# Patient Record
Sex: FEMALE | Race: WHITE | NOT HISPANIC OR LATINO | Employment: OTHER | URBAN - METROPOLITAN AREA
[De-identification: names, ages, dates, MRNs, and addresses within clinical notes are randomized per-mention and may not be internally consistent; named-entity substitution may affect disease eponyms.]

---

## 2018-03-14 ENCOUNTER — ANESTHESIA EVENT (OUTPATIENT)
Dept: OBGYN | Facility: CLINIC | Age: 33
End: 2018-03-14
Payer: COMMERCIAL

## 2018-03-14 ENCOUNTER — HOSPITAL ENCOUNTER (INPATIENT)
Facility: CLINIC | Age: 33
LOS: 3 days | Discharge: HOME | End: 2018-03-17
Attending: ADVANCED PRACTICE MIDWIFE | Admitting: OBSTETRICS & GYNECOLOGY
Payer: COMMERCIAL

## 2018-03-14 ENCOUNTER — SURGERY (OUTPATIENT)
Age: 33
End: 2018-03-14

## 2018-03-14 ENCOUNTER — ANESTHESIA (OUTPATIENT)
Dept: OBGYN | Facility: CLINIC | Age: 33
End: 2018-03-14
Payer: COMMERCIAL

## 2018-03-14 DIAGNOSIS — Z98.891 S/P CESAREAN SECTION: Primary | ICD-10-CM

## 2018-03-14 DIAGNOSIS — D68.51 HETEROZYGOUS FACTOR V LEIDEN MUTATION (H): ICD-10-CM

## 2018-03-14 PROBLEM — O41.00X0 ANHYDRAMNIOS: Status: ACTIVE | Noted: 2018-03-14

## 2018-03-14 LAB
ABO + RH BLD: NORMAL
ABO + RH BLD: NORMAL
BASOPHILS # BLD AUTO: 0 10E9/L (ref 0–0.2)
BASOPHILS NFR BLD AUTO: 0.2 %
BLD GP AB SCN SERPL QL: NORMAL
BLOOD BANK CMNT PATIENT-IMP: NORMAL
DIFFERENTIAL METHOD BLD: NORMAL
EOSINOPHIL # BLD AUTO: 0.1 10E9/L (ref 0–0.7)
EOSINOPHIL NFR BLD AUTO: 0.5 %
ERYTHROCYTE [DISTWIDTH] IN BLOOD BY AUTOMATED COUNT: 13.5 % (ref 10–15)
HCT VFR BLD AUTO: 39.2 % (ref 35–47)
HGB BLD-MCNC: 12.8 G/DL (ref 11.7–15.7)
IMM GRANULOCYTES # BLD: 0.1 10E9/L (ref 0–0.4)
IMM GRANULOCYTES NFR BLD: 0.9 %
LYMPHOCYTES # BLD AUTO: 2.4 10E9/L (ref 0.8–5.3)
LYMPHOCYTES NFR BLD AUTO: 21.5 %
MCH RBC QN AUTO: 30.5 PG (ref 26.5–33)
MCHC RBC AUTO-ENTMCNC: 32.7 G/DL (ref 31.5–36.5)
MCV RBC AUTO: 94 FL (ref 78–100)
MONOCYTES # BLD AUTO: 0.8 10E9/L (ref 0–1.3)
MONOCYTES NFR BLD AUTO: 7.2 %
NEUTROPHILS # BLD AUTO: 7.6 10E9/L (ref 1.6–8.3)
NEUTROPHILS NFR BLD AUTO: 69.7 %
NRBC # BLD AUTO: 0 10*3/UL
NRBC BLD AUTO-RTO: 0 /100
PLATELET # BLD AUTO: 193 10E9/L (ref 150–450)
RBC # BLD AUTO: 4.19 10E12/L (ref 3.8–5.2)
SPECIMEN EXP DATE BLD: NORMAL
WBC # BLD AUTO: 10.9 10E9/L (ref 4–11)

## 2018-03-14 PROCEDURE — 25000128 H RX IP 250 OP 636: Performed by: REGISTERED NURSE

## 2018-03-14 PROCEDURE — 36415 COLL VENOUS BLD VENIPUNCTURE: CPT | Performed by: ADVANCED PRACTICE MIDWIFE

## 2018-03-14 PROCEDURE — 36000059 ZZH SURGERY LEVEL 3 EA 15 ADDTL MIN UMMC: Performed by: OBSTETRICS & GYNECOLOGY

## 2018-03-14 PROCEDURE — 88307 TISSUE EXAM BY PATHOLOGIST: CPT | Mod: 26 | Performed by: STUDENT IN AN ORGANIZED HEALTH CARE EDUCATION/TRAINING PROGRAM

## 2018-03-14 PROCEDURE — 88307 TISSUE EXAM BY PATHOLOGIST: CPT | Performed by: STUDENT IN AN ORGANIZED HEALTH CARE EDUCATION/TRAINING PROGRAM

## 2018-03-14 PROCEDURE — 12000030 ZZH R&B OB INTERMEDIATE UMMC

## 2018-03-14 PROCEDURE — 37000009 ZZH ANESTHESIA TECHNICAL FEE, EACH ADDTL 15 MIN: Performed by: OBSTETRICS & GYNECOLOGY

## 2018-03-14 PROCEDURE — 25000125 ZZHC RX 250: Performed by: REGISTERED NURSE

## 2018-03-14 PROCEDURE — 25000128 H RX IP 250 OP 636: Performed by: ADVANCED PRACTICE MIDWIFE

## 2018-03-14 PROCEDURE — 27110028 ZZH OR GENERAL SUPPLY NON-STERILE: Performed by: OBSTETRICS & GYNECOLOGY

## 2018-03-14 PROCEDURE — 86900 BLOOD TYPING SEROLOGIC ABO: CPT | Performed by: ADVANCED PRACTICE MIDWIFE

## 2018-03-14 PROCEDURE — 25000128 H RX IP 250 OP 636: Performed by: ANESTHESIOLOGY

## 2018-03-14 PROCEDURE — 25000132 ZZH RX MED GY IP 250 OP 250 PS 637: Performed by: STUDENT IN AN ORGANIZED HEALTH CARE EDUCATION/TRAINING PROGRAM

## 2018-03-14 PROCEDURE — 86850 RBC ANTIBODY SCREEN: CPT | Performed by: ADVANCED PRACTICE MIDWIFE

## 2018-03-14 PROCEDURE — 40000671 ZZH STATISTIC ANESTHESIA CASE

## 2018-03-14 PROCEDURE — 86901 BLOOD TYPING SEROLOGIC RH(D): CPT | Performed by: ADVANCED PRACTICE MIDWIFE

## 2018-03-14 PROCEDURE — 40000170 ZZH STATISTIC PRE-PROCEDURE ASSESSMENT II: Performed by: OBSTETRICS & GYNECOLOGY

## 2018-03-14 PROCEDURE — 27210794 ZZH OR GENERAL SUPPLY STERILE: Performed by: OBSTETRICS & GYNECOLOGY

## 2018-03-14 PROCEDURE — 25000125 ZZHC RX 250: Performed by: ADVANCED PRACTICE MIDWIFE

## 2018-03-14 PROCEDURE — 36000057 ZZH SURGERY LEVEL 3 1ST 30 MIN - UMMC: Performed by: OBSTETRICS & GYNECOLOGY

## 2018-03-14 PROCEDURE — 71000014 ZZH RECOVERY PHASE 1 LEVEL 2 FIRST HR: Performed by: OBSTETRICS & GYNECOLOGY

## 2018-03-14 PROCEDURE — 37000008 ZZH ANESTHESIA TECHNICAL FEE, 1ST 30 MIN: Performed by: OBSTETRICS & GYNECOLOGY

## 2018-03-14 PROCEDURE — 85025 COMPLETE CBC W/AUTO DIFF WBC: CPT | Performed by: ADVANCED PRACTICE MIDWIFE

## 2018-03-14 PROCEDURE — 86780 TREPONEMA PALLIDUM: CPT | Performed by: ADVANCED PRACTICE MIDWIFE

## 2018-03-14 PROCEDURE — 25000125 ZZHC RX 250: Performed by: STUDENT IN AN ORGANIZED HEALTH CARE EDUCATION/TRAINING PROGRAM

## 2018-03-14 RX ORDER — ONDANSETRON 2 MG/ML
4 INJECTION INTRAMUSCULAR; INTRAVENOUS EVERY 30 MIN PRN
Status: DISCONTINUED | OUTPATIENT
Start: 2018-03-14 | End: 2018-03-14 | Stop reason: HOSPADM

## 2018-03-14 RX ORDER — SODIUM CHLORIDE, SODIUM LACTATE, POTASSIUM CHLORIDE, CALCIUM CHLORIDE 600; 310; 30; 20 MG/100ML; MG/100ML; MG/100ML; MG/100ML
INJECTION, SOLUTION INTRAVENOUS CONTINUOUS
Status: DISCONTINUED | OUTPATIENT
Start: 2018-03-14 | End: 2018-03-14 | Stop reason: HOSPADM

## 2018-03-14 RX ORDER — CLINDAMYCIN PHOSPHATE 900 MG/50ML
INJECTION, SOLUTION INTRAVENOUS PRN
Status: DISCONTINUED | OUTPATIENT
Start: 2018-03-14 | End: 2018-03-14

## 2018-03-14 RX ORDER — DIPHENHYDRAMINE HCL 25 MG
25 CAPSULE ORAL EVERY 6 HOURS PRN
Status: DISCONTINUED | OUTPATIENT
Start: 2018-03-14 | End: 2018-03-17 | Stop reason: HOSPADM

## 2018-03-14 RX ORDER — ACETAMINOPHEN 325 MG/1
650 TABLET ORAL EVERY 4 HOURS PRN
Status: DISCONTINUED | OUTPATIENT
Start: 2018-03-17 | End: 2018-03-17 | Stop reason: HOSPADM

## 2018-03-14 RX ORDER — DEXTROSE, SODIUM CHLORIDE, SODIUM LACTATE, POTASSIUM CHLORIDE, AND CALCIUM CHLORIDE 5; .6; .31; .03; .02 G/100ML; G/100ML; G/100ML; G/100ML; G/100ML
INJECTION, SOLUTION INTRAVENOUS CONTINUOUS
Status: DISCONTINUED | OUTPATIENT
Start: 2018-03-14 | End: 2018-03-17 | Stop reason: HOSPADM

## 2018-03-14 RX ORDER — HYDROCORTISONE 2.5 %
CREAM (GRAM) TOPICAL 3 TIMES DAILY PRN
Status: DISCONTINUED | OUTPATIENT
Start: 2018-03-14 | End: 2018-03-17 | Stop reason: HOSPADM

## 2018-03-14 RX ORDER — KETOROLAC TROMETHAMINE 30 MG/ML
30 INJECTION, SOLUTION INTRAMUSCULAR; INTRAVENOUS EVERY 6 HOURS
Status: DISPENSED | OUTPATIENT
Start: 2018-03-14 | End: 2018-03-15

## 2018-03-14 RX ORDER — IBUPROFEN 400 MG/1
400 TABLET, FILM COATED ORAL EVERY 6 HOURS PRN
Status: DISCONTINUED | OUTPATIENT
Start: 2018-03-14 | End: 2018-03-17 | Stop reason: HOSPADM

## 2018-03-14 RX ORDER — CEFAZOLIN SODIUM 1 G/3ML
INJECTION, POWDER, FOR SOLUTION INTRAMUSCULAR; INTRAVENOUS PRN
Status: DISCONTINUED | OUTPATIENT
Start: 2018-03-14 | End: 2018-03-14

## 2018-03-14 RX ORDER — OXYTOCIN/0.9 % SODIUM CHLORIDE 30/500 ML
100-340 PLASTIC BAG, INJECTION (ML) INTRAVENOUS CONTINUOUS PRN
Status: DISCONTINUED | OUTPATIENT
Start: 2018-03-14 | End: 2018-03-14

## 2018-03-14 RX ORDER — ONDANSETRON 2 MG/ML
4 INJECTION INTRAMUSCULAR; INTRAVENOUS EVERY 6 HOURS PRN
Status: DISCONTINUED | OUTPATIENT
Start: 2018-03-14 | End: 2018-03-17 | Stop reason: HOSPADM

## 2018-03-14 RX ORDER — CITRIC ACID/SODIUM CITRATE 334-500MG
30 SOLUTION, ORAL ORAL ONCE
Status: DISCONTINUED | OUTPATIENT
Start: 2018-03-14 | End: 2018-03-14

## 2018-03-14 RX ORDER — DIPHENHYDRAMINE HYDROCHLORIDE 50 MG/ML
25 INJECTION INTRAMUSCULAR; INTRAVENOUS EVERY 6 HOURS PRN
Status: DISCONTINUED | OUTPATIENT
Start: 2018-03-14 | End: 2018-03-17 | Stop reason: HOSPADM

## 2018-03-14 RX ORDER — MISOPROSTOL 200 UG/1
400 TABLET ORAL
Status: DISCONTINUED | OUTPATIENT
Start: 2018-03-14 | End: 2018-03-17 | Stop reason: HOSPADM

## 2018-03-14 RX ORDER — KETOROLAC TROMETHAMINE 30 MG/ML
INJECTION, SOLUTION INTRAMUSCULAR; INTRAVENOUS PRN
Status: DISCONTINUED | OUTPATIENT
Start: 2018-03-14 | End: 2018-03-14

## 2018-03-14 RX ORDER — ACETAMINOPHEN 325 MG/1
650 TABLET ORAL EVERY 4 HOURS PRN
Status: DISCONTINUED | OUTPATIENT
Start: 2018-03-14 | End: 2018-03-14

## 2018-03-14 RX ORDER — METHYLERGONOVINE MALEATE 0.2 MG/ML
200 INJECTION INTRAVENOUS
Status: COMPLETED | OUTPATIENT
Start: 2018-03-14 | End: 2018-03-14

## 2018-03-14 RX ORDER — IBUPROFEN 800 MG/1
800 TABLET, FILM COATED ORAL
Status: DISCONTINUED | OUTPATIENT
Start: 2018-03-14 | End: 2018-03-14

## 2018-03-14 RX ORDER — OXYTOCIN/0.9 % SODIUM CHLORIDE 30/500 ML
340 PLASTIC BAG, INJECTION (ML) INTRAVENOUS CONTINUOUS PRN
Status: DISCONTINUED | OUTPATIENT
Start: 2018-03-14 | End: 2018-03-17 | Stop reason: HOSPADM

## 2018-03-14 RX ORDER — OXYTOCIN/0.9 % SODIUM CHLORIDE 30/500 ML
PLASTIC BAG, INJECTION (ML) INTRAVENOUS CONTINUOUS PRN
Status: DISCONTINUED | OUTPATIENT
Start: 2018-03-14 | End: 2018-03-14

## 2018-03-14 RX ORDER — AMOXICILLIN 250 MG
1 CAPSULE ORAL 2 TIMES DAILY PRN
Status: DISCONTINUED | OUTPATIENT
Start: 2018-03-14 | End: 2018-03-17 | Stop reason: HOSPADM

## 2018-03-14 RX ORDER — ACETAMINOPHEN 325 MG/1
975 TABLET ORAL EVERY 8 HOURS
Status: DISCONTINUED | OUTPATIENT
Start: 2018-03-14 | End: 2018-03-17 | Stop reason: HOSPADM

## 2018-03-14 RX ORDER — ONDANSETRON 2 MG/ML
4 INJECTION INTRAMUSCULAR; INTRAVENOUS EVERY 6 HOURS PRN
Status: DISCONTINUED | OUTPATIENT
Start: 2018-03-14 | End: 2018-03-14

## 2018-03-14 RX ORDER — NALOXONE HYDROCHLORIDE 0.4 MG/ML
.1-.4 INJECTION, SOLUTION INTRAMUSCULAR; INTRAVENOUS; SUBCUTANEOUS
Status: DISCONTINUED | OUTPATIENT
Start: 2018-03-14 | End: 2018-03-17 | Stop reason: HOSPADM

## 2018-03-14 RX ORDER — HYDROMORPHONE HYDROCHLORIDE 1 MG/ML
.3-.5 INJECTION, SOLUTION INTRAMUSCULAR; INTRAVENOUS; SUBCUTANEOUS EVERY 5 MIN PRN
Status: DISCONTINUED | OUTPATIENT
Start: 2018-03-14 | End: 2018-03-14 | Stop reason: HOSPADM

## 2018-03-14 RX ORDER — OXYTOCIN 10 [USP'U]/ML
10 INJECTION, SOLUTION INTRAMUSCULAR; INTRAVENOUS
Status: DISCONTINUED | OUTPATIENT
Start: 2018-03-14 | End: 2018-03-17 | Stop reason: HOSPADM

## 2018-03-14 RX ORDER — FENTANYL CITRATE 50 UG/ML
50-100 INJECTION, SOLUTION INTRAMUSCULAR; INTRAVENOUS
Status: DISCONTINUED | OUTPATIENT
Start: 2018-03-14 | End: 2018-03-14

## 2018-03-14 RX ORDER — OXYTOCIN 10 [USP'U]/ML
10 INJECTION, SOLUTION INTRAMUSCULAR; INTRAVENOUS
Status: DISCONTINUED | OUTPATIENT
Start: 2018-03-14 | End: 2018-03-14

## 2018-03-14 RX ORDER — OXYCODONE HYDROCHLORIDE 5 MG/1
5-10 TABLET ORAL
Status: DISCONTINUED | OUTPATIENT
Start: 2018-03-14 | End: 2018-03-17 | Stop reason: HOSPADM

## 2018-03-14 RX ORDER — NALOXONE HYDROCHLORIDE 0.4 MG/ML
.1-.4 INJECTION, SOLUTION INTRAMUSCULAR; INTRAVENOUS; SUBCUTANEOUS
Status: DISCONTINUED | OUTPATIENT
Start: 2018-03-14 | End: 2018-03-14

## 2018-03-14 RX ORDER — LIDOCAINE 40 MG/G
CREAM TOPICAL
Status: DISCONTINUED | OUTPATIENT
Start: 2018-03-14 | End: 2018-03-14

## 2018-03-14 RX ORDER — LIDOCAINE 40 MG/G
CREAM TOPICAL
Status: DISCONTINUED | OUTPATIENT
Start: 2018-03-14 | End: 2018-03-17 | Stop reason: HOSPADM

## 2018-03-14 RX ORDER — OXYTOCIN/0.9 % SODIUM CHLORIDE 30/500 ML
100 PLASTIC BAG, INJECTION (ML) INTRAVENOUS CONTINUOUS
Status: DISCONTINUED | OUTPATIENT
Start: 2018-03-14 | End: 2018-03-17 | Stop reason: HOSPADM

## 2018-03-14 RX ORDER — IBUPROFEN 800 MG/1
800 TABLET, FILM COATED ORAL EVERY 6 HOURS PRN
Status: DISCONTINUED | OUTPATIENT
Start: 2018-03-14 | End: 2018-03-17 | Stop reason: HOSPADM

## 2018-03-14 RX ORDER — OXYCODONE AND ACETAMINOPHEN 5; 325 MG/1; MG/1
1 TABLET ORAL
Status: DISCONTINUED | OUTPATIENT
Start: 2018-03-14 | End: 2018-03-14

## 2018-03-14 RX ORDER — BISACODYL 10 MG
10 SUPPOSITORY, RECTAL RECTAL DAILY PRN
Status: DISCONTINUED | OUTPATIENT
Start: 2018-03-16 | End: 2018-03-17 | Stop reason: HOSPADM

## 2018-03-14 RX ORDER — CARBOPROST TROMETHAMINE 250 UG/ML
250 INJECTION, SOLUTION INTRAMUSCULAR
Status: DISCONTINUED | OUTPATIENT
Start: 2018-03-14 | End: 2018-03-14

## 2018-03-14 RX ORDER — ONDANSETRON 4 MG/1
4 TABLET, ORALLY DISINTEGRATING ORAL EVERY 30 MIN PRN
Status: DISCONTINUED | OUTPATIENT
Start: 2018-03-14 | End: 2018-03-14 | Stop reason: HOSPADM

## 2018-03-14 RX ORDER — AMOXICILLIN 250 MG
2 CAPSULE ORAL 2 TIMES DAILY PRN
Status: DISCONTINUED | OUTPATIENT
Start: 2018-03-14 | End: 2018-03-17 | Stop reason: HOSPADM

## 2018-03-14 RX ORDER — FENTANYL CITRATE 50 UG/ML
25-50 INJECTION, SOLUTION INTRAMUSCULAR; INTRAVENOUS
Status: DISCONTINUED | OUTPATIENT
Start: 2018-03-14 | End: 2018-03-14 | Stop reason: HOSPADM

## 2018-03-14 RX ORDER — SODIUM CHLORIDE, SODIUM LACTATE, POTASSIUM CHLORIDE, CALCIUM CHLORIDE 600; 310; 30; 20 MG/100ML; MG/100ML; MG/100ML; MG/100ML
INJECTION, SOLUTION INTRAVENOUS CONTINUOUS
Status: DISCONTINUED | OUTPATIENT
Start: 2018-03-14 | End: 2018-03-14

## 2018-03-14 RX ORDER — LANOLIN 100 %
OINTMENT (GRAM) TOPICAL
Status: DISCONTINUED | OUTPATIENT
Start: 2018-03-14 | End: 2018-03-17 | Stop reason: HOSPADM

## 2018-03-14 RX ORDER — PROPOFOL 10 MG/ML
INJECTION, EMULSION INTRAVENOUS PRN
Status: DISCONTINUED | OUTPATIENT
Start: 2018-03-14 | End: 2018-03-14

## 2018-03-14 RX ORDER — SIMETHICONE 80 MG
80 TABLET,CHEWABLE ORAL 4 TIMES DAILY PRN
Status: DISCONTINUED | OUTPATIENT
Start: 2018-03-14 | End: 2018-03-17 | Stop reason: HOSPADM

## 2018-03-14 RX ORDER — OXYTOCIN/0.9 % SODIUM CHLORIDE 30/500 ML
PLASTIC BAG, INJECTION (ML) INTRAVENOUS
Status: DISCONTINUED
Start: 2018-03-14 | End: 2018-03-14 | Stop reason: HOSPADM

## 2018-03-14 RX ADMIN — SODIUM CHLORIDE, POTASSIUM CHLORIDE, SODIUM LACTATE AND CALCIUM CHLORIDE: 600; 310; 30; 20 INJECTION, SOLUTION INTRAVENOUS at 19:21

## 2018-03-14 RX ADMIN — FENTANYL CITRATE 100 MCG: 50 INJECTION, SOLUTION INTRAMUSCULAR; INTRAVENOUS at 19:56

## 2018-03-14 RX ADMIN — HYDROMORPHONE HYDROCHLORIDE 0.3 MG: 1 INJECTION, SOLUTION INTRAMUSCULAR; INTRAVENOUS; SUBCUTANEOUS at 20:36

## 2018-03-14 RX ADMIN — HYDROMORPHONE HYDROCHLORIDE 0.5 MG: 1 INJECTION, SOLUTION INTRAMUSCULAR; INTRAVENOUS; SUBCUTANEOUS at 22:25

## 2018-03-14 RX ADMIN — FENTANYL CITRATE 50 MCG: 50 INJECTION, SOLUTION INTRAMUSCULAR; INTRAVENOUS at 21:09

## 2018-03-14 RX ADMIN — HYDROMORPHONE HYDROCHLORIDE 0.2 MG: 1 INJECTION, SOLUTION INTRAMUSCULAR; INTRAVENOUS; SUBCUTANEOUS at 20:44

## 2018-03-14 RX ADMIN — METHYLERGONOVINE MALEATE 200 MCG: 0.2 INJECTION INTRAMUSCULAR; INTRAVENOUS at 20:05

## 2018-03-14 RX ADMIN — SODIUM CHLORIDE, POTASSIUM CHLORIDE, SODIUM LACTATE AND CALCIUM CHLORIDE: 600; 310; 30; 20 INJECTION, SOLUTION INTRAVENOUS at 22:40

## 2018-03-14 RX ADMIN — PROPOFOL 200 MG: 10 INJECTION, EMULSION INTRAVENOUS at 19:49

## 2018-03-14 RX ADMIN — HYDROMORPHONE HYDROCHLORIDE 0.5 MG: 1 INJECTION, SOLUTION INTRAMUSCULAR; INTRAVENOUS; SUBCUTANEOUS at 21:20

## 2018-03-14 RX ADMIN — FENTANYL CITRATE 25 MCG: 50 INJECTION, SOLUTION INTRAMUSCULAR; INTRAVENOUS at 21:05

## 2018-03-14 RX ADMIN — FENTANYL CITRATE 25 MCG: 50 INJECTION, SOLUTION INTRAMUSCULAR; INTRAVENOUS at 20:59

## 2018-03-14 RX ADMIN — CLINDAMYCIN PHOSPHATE 900 MG: 18 INJECTION, SOLUTION INTRAVENOUS at 20:12

## 2018-03-14 RX ADMIN — HYDROMORPHONE HYDROCHLORIDE 0.5 MG: 1 INJECTION, SOLUTION INTRAMUSCULAR; INTRAVENOUS; SUBCUTANEOUS at 21:39

## 2018-03-14 RX ADMIN — ONDANSETRON 4 MG: 2 INJECTION INTRAMUSCULAR; INTRAVENOUS at 19:56

## 2018-03-14 RX ADMIN — OXYCODONE HYDROCHLORIDE 5 MG: 5 TABLET ORAL at 22:44

## 2018-03-14 RX ADMIN — OXYTOCIN-SODIUM CHLORIDE 0.9% IV SOLN 30 UNIT/500ML 600 ML/HR: 30-0.9/5 SOLUTION at 19:51

## 2018-03-14 RX ADMIN — HYDROMORPHONE HYDROCHLORIDE 0.3 MG: 1 INJECTION, SOLUTION INTRAMUSCULAR; INTRAVENOUS; SUBCUTANEOUS at 20:51

## 2018-03-14 RX ADMIN — KETOROLAC TROMETHAMINE 30 MG: 30 INJECTION, SOLUTION INTRAMUSCULAR at 20:37

## 2018-03-14 RX ADMIN — OXYTOCIN-SODIUM CHLORIDE 0.9% IV SOLN 30 UNIT/500ML 100 ML/HR: 30-0.9/5 SOLUTION at 22:40

## 2018-03-14 RX ADMIN — ACETAMINOPHEN 975 MG: 325 TABLET, FILM COATED ORAL at 21:25

## 2018-03-14 RX ADMIN — SODIUM CHLORIDE, POTASSIUM CHLORIDE, SODIUM LACTATE AND CALCIUM CHLORIDE: 600; 310; 30; 20 INJECTION, SOLUTION INTRAVENOUS at 19:45

## 2018-03-14 RX ADMIN — HYDROMORPHONE HYDROCHLORIDE 0.2 MG: 1 INJECTION, SOLUTION INTRAMUSCULAR; INTRAVENOUS; SUBCUTANEOUS at 20:53

## 2018-03-14 ASSESSMENT — LIFESTYLE VARIABLES: TOBACCO_USE: 0

## 2018-03-14 ASSESSMENT — COPD QUESTIONNAIRES: COPD: 0

## 2018-03-14 NOTE — IP AVS SNAPSHOT
[facility]    [address] [address]    Phone:  [phone]                                       After Visit Summary   3/14/2018    Lois Nunez    MRN: [id number]           After Visit Summary Signature Page     I have received my discharge instructions, and my questions have been answered. I have discussed any challenges I see with this plan with the nurse or doctor.    ..........................................................................................................................................  Patient/Patient Representative Signature      ..........................................................................................................................................  Patient Representative Print Name and Relationship to Patient    ..................................................               ................................................  Date                                            Time    ..........................................................................................................................................  Reviewed by Signature/Title    ...................................................              ..............................................  Date                                                            Time

## 2018-03-14 NOTE — H&P
ADMIT NOTE  =================  42w0d  Lois Nunez is a 32 year old female     with an No LMP recorded. and Estimated Date of Delivery: Data Unavailable is [facility]/2018 at 4:54 PM for induction of labor.  Indication Postdates with KESHA 0.   Fetal movement- active  ROM- no   GBS- not done per pt refusal    HPI  ================  Pt is here with her  and home birth midwife as a transfer to [facility] midwifery service for IOL for postdates and KESHA on 0 noted on KESHA today.  Pt is hesitant to have IOL but has been trying everything including chiropractic, acupuncture, and exercises to bring on labor.  Agreeable to SVE to then discuss plan of care.    FOB- is involved, present and supportive.  Other labor support- Home Birth Midwife Gricel    PRENATAL COURSE  =================  Prenatal course was   complicated by    Patient Active Problem List    Diagnosis Date Noted     Anhydramnios 2018     Priority: Medium     S/P  section 2018     Priority: Medium     CARDIOVASCULAR SCREENING; LDL GOAL LESS THAN 160 07/10/2012     Priority: Medium    Regular care received at Home Birth clinic.  Started care at 10 weeks gestation, 14 total visits.  Pre pregnancy weight 150. Pre pregnancy BMI 23.  Height 57in  Denies smoking, drug, etoh use during pregnancy.    HISTORIES  ============  Prenatal record reviewed  Allergies   Allergen Reactions     Amoxicillin Hives     As a child     Codeine Sulfate GI Disturbance     Erythromycin GI Disturbance     Past Medical History:   Diagnosis Date     Uncomplicated asthma      Past Surgical History:   Procedure Laterality Date      SECTION N/A 3/14/2018    Procedure:  SECTION;;  Surgeon: Paola Avila MD;  Location: [facility] L+D     LAPAROSCOPIC ABLATION ENDOMETRIOSIS     .  Family History   Problem Relation Age of Onset     DIABETES Maternal Grandmother      HEART DISEASE Maternal Grandmother      Cancer - colorectal  Paternal Grandmother      HEART DISEASE Paternal Grandfather      Social History   Substance Use Topics     Smoking status: Never Smoker     Smokeless tobacco: Never Used     Alcohol use Yes      Comment: ';     Obstetric History       T3      L2     SAB0   TAB0   Ectopic0   Multiple0   Live Births2       # Outcome Date GA Lbr Matthew/2nd Weight Sex Delivery Anes PTL Lv   3 Term 18 42w0d  3.61 kg (7 lb 15.3 oz) F CS-LTranv         Name: MILLIE ESPARZA RAJAN      Apgar1:  7                Apgar5: 8   2 Term 2015 40w3d   F  None  CAMRYN   1 Term 2013 38w3d   M    CAMRYN           LABS:   ===========  GBS - Pt refused screening.   Other labs- O+ reported.   Hg 18 11.9  Hg 10/31/17 11.0    No other prenatal labs readily available - obtain from homebirth midwifery service or draw prn.     ULTRA SOUND:  ==============  No dating or anatomy scan record available.  Pt states no known issue in prengnacy.     BPP 6/8 for KESHA of 0, Cephalic, 3/14/18.     ROS  =========  Pt denies significant respiratory, cardiovacular, GI, or muscular/skeletalcomplaints.    See RN data base ROS.       PHYSICAL EXAM:  ===============  Blood pressure 107/59, pulse 62, temperature 98.6  F (37  C), temperature source Oral, resp. rate 16, SpO2 99 %, unknown if currently breastfeeding.  General appearance: comfortable  Heart: RRR without murmur  Lungs: clear to auscultation   Neuro: denies headache and visual disturbances  Psych: Mentation normal and bright   Legs: 2+/2+, no clonus, no edema      Abdomen: gravid, single vertex fetus, non-tender between contractions.  EFW-  7.5 lbs.   CONTACTIONS: Contractions every 5-8 minutes.  Palpate: mild. Sof resting tone.   FETAL HEART TONES: baseline 150 with moderate FHR variability and  pos accelerations.  No decelerations present.      PELVIC EXAM: 1.5/60/ Mid/ soft/ -2   Large bloody show  MARTINEZ SCORE: 6  BLOODY SHOW: yes s/p SVE   ROM: No. IBOW palpated  FLUID: none  AMNISURE:  not done    ASSESSMENT:  ==============  32 year old  with IUP @ 42w0d in IOL for KEHSA of 0 and postdates   Transfer from Homebirth midwife  Cisse score >5, ripe for multip  Fetal Heart rate tracing Category one  GBS- Pt declined screening  Incomplete prenatal lab panel/ultrasound results       PLAN:  ===========  Admit - see IP orders  -IV saline lock, T&S. Continuous EFM - may use telemetry.   - Reviewed Cisse score >5 for multip does not meet criteria for cervical ripening.  Reviewed AROM evidence based as best option when combined with pitocin but can be offered as initial step if pt desires to avoid pitocin.  Reviewed recommendation of avoiding prolonged ROM with unknown GBS status.  Pt angelica more since SVE, now with some discomfort.  Is open to consider labor induction with Pitocin but would like SVE rechecked to see if changing with current TOCO pattern in 1-2 hours.   -Pain medication - pt planning unmedicated labor.  Plans to use Hydrotherapy, upright.mobility.  Reviewed risked out of waterbirth d/t need for continuous EFM.   -Reviewed GBS status unknown at term, recommend Prophylactic antibiotics per CDC guidelines if ROM>18 hours, pt will consider.    - Discuss with homebirth midwife additional prenatals available or draw prn  -MD consultant on call Margarita/ available in house prn. Will be updated of pt's status on floor.   - Reassess in 1-2 hours and readdress induction with pitocin and prn     Liz Morton          ADDENDUM 3/14/2018 6:02 PM    - FHR with noted late decelerations x2 with maternal position sitting upright/straight in bed.  Moderate variability.  Maternal position readjusted, IV in place.  Margarita TRIPLETT given text page update that pt is here admitted and recent decelerations that resolved.  Liz Morton APRN, CNM

## 2018-03-14 NOTE — PLAN OF CARE
Problem: Patient Care Overview  Goal: Plan of Care/Patient Progress Review  Outcome: Improving  Data: Patient presented to [facility] at 1620.   Reason for maternal/fetal assessment per patient is Induction Of Labor  .  Patient is a . Prenatal record reviewed.      Obstetric History       T2      L2     SAB0   TAB0   Ectopic0   Multiple0   Live Births2       # Outcome Date GA Lbr Matthew/2nd Weight Sex Delivery Anes PTL Lv   3 Current            2 Term 2015 40w3d   F  None  CAMRYN   1 Term 2013 38w3d   M    CAMRYN      . Medical history:   Past Medical History:   Diagnosis Date     Uncomplicated asthma    . Gestational Age 42w0d. VSS. Fetal movement present. Patient denies cramping, backache, vaginal discharge, pelvic pressure, UTI symptoms, GI problems, bloody show, vaginal bleeding, edema, headache, visual disturbances, epigastric or URQ pain, abdominal pain, rupture of membranes. Support persons Jonathon and Gricel- her midwife present.  Action: Verbal consent for EFM. Triage assessment completed. EFM applied upon admission- baby needs continuous monitoring. Uterine assessment reveals occasional contractions. Fetal assessment: Presumed adequate fetal oxygenation documented (see flow record).   Response: Liz Morton CNM informed of patient arrival. Plan per provider is induction of labor for KESHA of 0. Liz recommending pitocin or AROM for induction. Patient verbalized agreement with plan. SVE 1.5/60/-2. Patient now angelica every 2-3 minutes after manual sweep with SVE. Patient would like the FHR telemetry unit set up and to wait to see if this progresses to active labor before interventions. Patient transferred to room 471 ambulatory, oriented to room and call light. Report given to Soumya Saeed RN.

## 2018-03-14 NOTE — IP AVS SNAPSHOT
MRN:[id number]                      After Visit Summary   3/14/2018    Lois Nunez    MRN: [id number]           Thank you!     Thank you for choosing [facility] for your care. Our goal is always to provide you with excellent care. Hearing back from our patients is one way we can continue to improve our services. Please take a few minutes to complete the written survey that you may receive in the mail after you visit with us. Thank you!        Patient Information     Date Of Birth          1985        Designated Caregiver       Most Recent Value    Caregiver    Will someone help with your care after discharge? no      About your hospital stay     You were admitted on:  2018 You last received care in the:  [facility]    You were discharged on:  2018        Reason for your hospital stay       Maternity care                  Who to Call     For medical emergencies, please call 911.  For non-urgent questions about your medical care, please call your primary care provider or clinic, None  For questions related to your surgery, please call your surgery clinic        Attending Provider     Provider Specialty    Liz Morton APRN CNM [facility]    Paola Avila MD OB/Gyn       Primary Care Provider Fax #    Physician No Ref-Primary [phone]      After Care Instructions     Activity       Review discharge instructions            Diet       Resume previous diet            Discharge Instructions - Postpartum visit       Schedule postpartum visit with your provider and return to clinic in 1 and 6 weeks.                  Follow-up Appointments     Follow Up and recommended labs and tests       Please follow up with your OBGYN in 6 weeks for a postpartum visit                  Further instructions from your care team       Postop  Birth Instructions    Activity       Do not lift more than 10 pounds for 6 weeks after surgery.  Ask family and friends for help when  you need it.    No driving until you have stopped taking your pain medications (usually two weeks after surgery).    No heavy exercise or activity for 6 weeks.  Don't do anything that will put a strain on your surgery site.    Don't strain when using the toilet.  Your care team may prescribe a stool softener if you have problems with your bowel movements.     To care for your incision:       Keep the incision clean and dry.    Do not soak your incision in water. No swimming or hot tubs until it has fully healed. You may soak in the bathtub if the water level is below your incision.    Do not use peroxide, gel, cream, lotion, or ointment on your incision.    Adjust your clothes to avoid pressure on your surgery site (check the elastic in your underwear for example).     You may see a small amount of clear or pink drainage and this is normal.  Check with your health care provider:       If the drainage increases or has an odor.    If the incision reddens, you have swelling, or develop a rash.    If you have increased pain and the medicine we prescribed doesn't help.    If you have a fever above 100.4 F (38 C) with or without chills when placing thermometer under your tongue.   The area around your incision (surgery wound), will feel numb.  This is normal. The numbness should go away in less than a year.     Keep your hands clean:  Always wash your hands before touching your incision (surgery wound). This helps reduce your risk of infection. If your hands aren't dirty, you may use an alcohol hand-rub to clean your hands. Keep your nails clean and short.    Call your healthcare provider if you have any of these symptoms:       You soak a sanitary pad with blood within 1 hour, or you see blood clots larger than a golf ball.    Bleeding that lasts more than 6 weeks.    Vaginal discharge that smells bad.    Severe pain, cramping or tenderness in your lower belly area.    A need to urinate more frequently (use the toilet  "more often), more urgently (use the toilet very quickly), or it burns when you urinate.    Nausea and vomiting.    Redness, swelling or pain around a vein in your leg.    Problems breastfeeding or a red or painful area on your breast.    Chest pain and cough or are gasping for air.    Problems with coping with sadness, anxiety or depression. If you have concerns about hurting yourself or the baby, call your provider immediately.      You have questions or concerns after you return home.                  Pending Results     Date and Time Order Name Status Description    3/14/2018 2120 Placenta path order and indications In process             Statement of Approval     Ordered          18 0936  I have reviewed and agree with all the recommendations and orders detailed in this document.  EFFECTIVE NOW     Approved and electronically signed by:  Jazmine Feldman MD             Admission Information     Date & Time Provider Department Dept. Phone    3/14/2018 Paola Avila MD [facility] [phone]      Your Vitals Were     Blood Pressure Pulse Temperature Respirations Pulse Oximetry       111/64 82 97.9  F (36.6  C) (Oral) 16 100%       MyChart Information     [facility] lets you send messages to your doctor, view your test results, renew your prescriptions, schedule appointments and more. To sign up, go to [email or web address] . Click on \"Log in\" on the left side of the screen, which will take you to the Welcome page. Then click on \"Sign up Now\" on the right side of the page.     You will be asked to enter the access code listed below, as well as some personal information. Please follow the directions to create your username and password.     Your access code is: [email or web address]  Expires: 6/15/2018 11:11 AM     Your access code will  in 90 days. If you need help or a new code, please call your [facility] clinic or [phone].        Care EveryWhere ID     This is your Care EveryWhere ID. This could " be used by other organizations to access your [facility] medical records  [id number]        Equal Access to Services     ROSA REYES : Hadii mari Teran, wadavidda saw, qaybta kaalmada tdshelbydaisy, jorge luis cristobaljodielucy remy. So [facility] [phone].    ATENCIÓN: Si habla español, tiene a murguia disposición servicios gratuitos de asistencia lingüística. Llame al [phone].    We comply with applicable federal civil rights laws and [place] laws. We do not discriminate on the basis of race, color, national origin, age, disability, sex, sexual orientation, or gender identity.               Review of your medicines      START taking        Dose / Directions    acetaminophen 325 MG tablet   Commonly known as:  TYLENOL        Dose:  325-650 mg   Take 1-2 tablets (325-650 mg) by mouth every 6 hours as needed for mild pain   Quantity:  100 tablet   Refills:  0       enoxaparin 40 MG/0.4ML injection   Commonly known as:  LOVENOX   Used for:  Heterozygous factor V Leiden mutation (H)        Dose:  40 mg   Inject 0.4 mLs (40 mg) Subcutaneous every 24 hours   Quantity:  42 Syringe   Refills:  0       ibuprofen 600 MG tablet   Commonly known as:  ADVIL/MOTRIN        Dose:  600 mg   Take 1 tablet (600 mg) by mouth every 6 hours as needed for other (carmping)   Quantity:  100 tablet   Refills:  0       oxyCODONE IR 5 MG tablet   Commonly known as:  ROXICODONE        Dose:  5-10 mg   Take 1-2 tablets (5-10 mg) by mouth every 4 hours as needed for other (pain control or improvement in physical function. Hold dose for analgesic side effects.)   Quantity:  15 tablet   Refills:  0       senna-docusate 8.6-50 MG per tablet   Commonly known as:  SENOKOT-S;PERICOLACE        Dose:  2 tablet   Take 2 tablets by mouth 2 times daily as needed for constipation   Quantity:  100 tablet   Refills:  0         STOP taking     HYDROcodone-acetaminophen 5-500 MG per tablet   Commonly known as:  VICODIN           MIRENA (52  MG) 20 MCG/24HR IUD   Generic drug:  levonorgestrel                Where to get your medicines      These medications were sent to [facility] [place], [place] - [address]  [address], [facility] [address]     Phone:  [phone]     acetaminophen 325 MG tablet    enoxaparin 40 MG/0.4ML injection    ibuprofen 600 MG tablet    senna-docusate 8.6-50 MG per tablet         Some of these will need a paper prescription and others can be bought over the counter. Ask your nurse if you have questions.     Bring a paper prescription for each of these medications     oxyCODONE IR 5 MG tablet                Protect others around you: Learn how to safely use, store and throw away your medicines at www.disposemymeds.org.        Information about OPIOIDS     PRESCRIPTION OPIOIDS: WHAT YOU NEED TO KNOW    Prescription opioids can be used to help relieve moderate to severe pain and are often prescribed following a surgery or injury, or for certain health conditions. These medications can be an important part of treatment but also come with serious risks. It is important to work with your health care provider to make sure you are getting the safest, most effective care.    WHAT ARE THE RISKS AND SIDE EFFECTS OF OPIOID USE?  Prescription opioids carry serious risks of addiction and overdose, especially with prolonged use. An opioid overdose, often marked by slowed breathing can cause sudden death. The use of prescription opioids can have a number of side effects as well, even when taken as directed:      Tolerance - meaning you might need to take more of a medication for the same pain relief    Physical dependence - meaning you have symptoms of withdrawal when a medication is stopped    Increased sensitivity to pain    Constipation    Nausea, vomiting, and dry mouth    Sleepiness and dizziness    Confusion    Depression    Low levels of testosterone that can result in lower sex drive, energy, and  strength    Itching and sweating    RISKS ARE GREATER WITH:    History of drug misuse, substance use disorder, or overdose    Mental health conditions (such as depression or anxiety)    Sleep apnea    Older age (65 years or older)    Pregnancy    Avoid alcohol while taking prescription opioids.   Also, unless specifically advised by your health care provider, medications to avoid include:    Benzodiazepines (such as Xanax or Valium)    Muscle relaxants (such as Soma or Flexeril)    Hypnotics (such as Ambien or Lunesta)    Other prescription opioids    KNOW YOUR OPTIONS:  Talk to your health care provider about ways to manage your pain that do not involve prescription opioids. Some of these options may actually work better and have fewer risks and side effects:    Pain relievers such as acetaminophen, ibuprofen, and naproxen    Some medications that are also used for depression or seizures    Physical therapy and exercise    Cognitive behavioral therapy, a psychological, goal-directed approach, in which patients learn how to modify physical, behavioral, and emotional triggers of pain and stress    IF YOU ARE PRESCRIBED OPIOIDS FOR PAIN:    Never take opioids in greater amounts or more often than prescribed    Follow up with your primary health care provider and work together to create a plan on how to manage your pain.    Talk about ways to help manage your pain that do not involve prescription opioids    Talk about all concerns and side effects    Help prevent misuse and abuse    Never sell or share prescription opioids    Never use another person's prescription opioids    Store prescription opioids in a secure place and out of reach of others (this may include visitors, children, friends, and family)    Visit www.cdc.gov/drugoverdose to learn about risks of opioid abuse and overdose    If you believe you may be struggling with addiction, tell your health care provider and ask for guidance or call [facility] National  Helpline at 3-006-373-HELP    LEARN MORE / www.cdc.gov/drugoverdose/prescribing/guideline.html    Safely dispose of unused prescription opioids: Find your local drug take-back programs and more information about the importance of safe disposal at www.doseofreality.[place].gov             Medication List: This is a list of all your medications and when to take them. Check marks below indicate your daily home schedule. Keep this list as a reference.      Medications           Morning Afternoon Evening Bedtime As Needed    acetaminophen 325 MG tablet   Commonly known as:  TYLENOL   Take 1-2 tablets (325-650 mg) by mouth every 6 hours as needed for mild pain   Last time this was given:  975 mg on 3/17/2018  5:03 AM                                enoxaparin 40 MG/0.4ML injection   Commonly known as:  LOVENOX   Inject 0.4 mLs (40 mg) Subcutaneous every 24 hours   Last time this was given:  40 mg on 3/17/2018 11:19 AM                                ibuprofen 600 MG tablet   Commonly known as:  ADVIL/MOTRIN   Take 1 tablet (600 mg) by mouth every 6 hours as needed for other (carmping)   Last time this was given:  800 mg on 3/17/2018  5:03 AM                                oxyCODONE IR 5 MG tablet   Commonly known as:  ROXICODONE   Take 1-2 tablets (5-10 mg) by mouth every 4 hours as needed for other (pain control or improvement in physical function. Hold dose for analgesic side effects.)   Last time this was given:  10 mg on 3/17/2018  9:34 AM                                senna-docusate 8.6-50 MG per tablet   Commonly known as:  SENOKOT-S;PERICOLACE   Take 2 tablets by mouth 2 times daily as needed for constipation   Last time this was given:  1 tablet on 3/17/2018  9:34 AM

## 2018-03-15 LAB
HBV SURFACE AG SERPL QL IA: NONREACTIVE
HGB BLD-MCNC: 10.5 G/DL (ref 11.7–15.7)
HIV 1+2 AB+HIV1 P24 AG SERPL QL IA: NONREACTIVE
RUBV IGG SERPL IA-ACNC: 18 IU/ML
T PALLIDUM IGG+IGM SER QL: NEGATIVE

## 2018-03-15 PROCEDURE — 12000030 ZZH R&B OB INTERMEDIATE UMMC

## 2018-03-15 PROCEDURE — 25000132 ZZH RX MED GY IP 250 OP 250 PS 637: Performed by: STUDENT IN AN ORGANIZED HEALTH CARE EDUCATION/TRAINING PROGRAM

## 2018-03-15 PROCEDURE — 25000128 H RX IP 250 OP 636: Performed by: STUDENT IN AN ORGANIZED HEALTH CARE EDUCATION/TRAINING PROGRAM

## 2018-03-15 PROCEDURE — 36415 COLL VENOUS BLD VENIPUNCTURE: CPT | Performed by: STUDENT IN AN ORGANIZED HEALTH CARE EDUCATION/TRAINING PROGRAM

## 2018-03-15 PROCEDURE — 85018 HEMOGLOBIN: CPT | Performed by: STUDENT IN AN ORGANIZED HEALTH CARE EDUCATION/TRAINING PROGRAM

## 2018-03-15 PROCEDURE — 87340 HEPATITIS B SURFACE AG IA: CPT | Performed by: STUDENT IN AN ORGANIZED HEALTH CARE EDUCATION/TRAINING PROGRAM

## 2018-03-15 PROCEDURE — 86762 RUBELLA ANTIBODY: CPT | Performed by: STUDENT IN AN ORGANIZED HEALTH CARE EDUCATION/TRAINING PROGRAM

## 2018-03-15 PROCEDURE — 87389 HIV-1 AG W/HIV-1&-2 AB AG IA: CPT | Performed by: STUDENT IN AN ORGANIZED HEALTH CARE EDUCATION/TRAINING PROGRAM

## 2018-03-15 RX ADMIN — KETOROLAC TROMETHAMINE 30 MG: 30 INJECTION, SOLUTION INTRAMUSCULAR at 10:09

## 2018-03-15 RX ADMIN — IBUPROFEN 800 MG: 800 TABLET, FILM COATED ORAL at 21:39

## 2018-03-15 RX ADMIN — SIMETHICONE CHEW TAB 80 MG 80 MG: 80 TABLET ORAL at 16:29

## 2018-03-15 RX ADMIN — OXYCODONE HYDROCHLORIDE 5 MG: 5 TABLET ORAL at 07:39

## 2018-03-15 RX ADMIN — SIMETHICONE CHEW TAB 80 MG 80 MG: 80 TABLET ORAL at 21:39

## 2018-03-15 RX ADMIN — OXYCODONE HYDROCHLORIDE 5 MG: 5 TABLET ORAL at 04:57

## 2018-03-15 RX ADMIN — SIMETHICONE CHEW TAB 80 MG 80 MG: 80 TABLET ORAL at 11:19

## 2018-03-15 RX ADMIN — OXYCODONE HYDROCHLORIDE 10 MG: 5 TABLET ORAL at 14:48

## 2018-03-15 RX ADMIN — KETOROLAC TROMETHAMINE 30 MG: 30 INJECTION, SOLUTION INTRAMUSCULAR at 16:05

## 2018-03-15 RX ADMIN — ACETAMINOPHEN 975 MG: 325 TABLET, FILM COATED ORAL at 13:01

## 2018-03-15 RX ADMIN — SENNOSIDES AND DOCUSATE SODIUM 2 TABLET: 8.6; 5 TABLET ORAL at 07:39

## 2018-03-15 RX ADMIN — ACETAMINOPHEN 975 MG: 325 TABLET, FILM COATED ORAL at 21:07

## 2018-03-15 RX ADMIN — SENNOSIDES AND DOCUSATE SODIUM 1 TABLET: 8.6; 5 TABLET ORAL at 21:08

## 2018-03-15 RX ADMIN — OXYCODONE HYDROCHLORIDE 5 MG: 5 TABLET ORAL at 21:08

## 2018-03-15 RX ADMIN — OXYCODONE HYDROCHLORIDE 5 MG: 5 TABLET ORAL at 11:19

## 2018-03-15 RX ADMIN — ACETAMINOPHEN 975 MG: 325 TABLET, FILM COATED ORAL at 04:57

## 2018-03-15 RX ADMIN — OXYCODONE HYDROCHLORIDE 5 MG: 5 TABLET ORAL at 01:41

## 2018-03-15 RX ADMIN — OXYCODONE HYDROCHLORIDE 5 MG: 5 TABLET ORAL at 17:53

## 2018-03-15 RX ADMIN — KETOROLAC TROMETHAMINE 30 MG: 30 INJECTION, SOLUTION INTRAMUSCULAR at 04:15

## 2018-03-15 NOTE — PROGRESS NOTES
[facility]   Post-partum Note    Name:  Lois Nunez  MRN: [id number]    S: Patient is resting comfortably. She was down to the NICU overnight.  Pain is controlled.  Denies dizziness, chest pain, SOB, nausea or vomiting. Tolerating regular diet without nausea or vomiting.  Ambulating without dizziness. Duffy is still in. No flatus.  Lochia is similar to menses.  Breast feeding.  Plans natural family planning for postpartum contraception.     O:   Patient Vitals for the past 24 hrs:   BP Temp Temp src Pulse Heart Rate Resp SpO2   03/15/18 0455 112/59 98.5  F (36.9  C) Oral 69 - 17 98 %   03/15/18 0400 128/67 97.9  F (36.6  C) Oral 66 - 16 100 %   03/15/18 0300 119/74 98.6  F (37  C) Oral 72 - 16 98 %   03/15/18 0200 125/72 97.8  F (36.6  C) Oral 73 - 17 100 %   03/15/18 0100 112/61 97.9  F (36.6  C) Oral 70 - 16 100 %   03/15/18 0030 119/72 98.3  F (36.8  C) Oral 75 - 16 98 %   03/15/18 0000 127/69 98.6  F (37  C) Oral 73 - 17 100 %   18 2211 137/79 98.1  F (36.7  C) Oral - - 11 -   18 128/75 - - - 68 10 -   18 - - - - 66 12 97 %   18 120/75 - - - 63 11 -   18 120/75 97.7  F (36.5  C) Axillary - 61 (!) 6 98 %   185 129/77 - - - 67 - 96 %   18 - - - - - 15 -   18 2100 (!) 146/91 97.5  F (36.4  C) - - 72 10 100 %   18 2049 135/76 - - - - (!) 42 100 %   18 1633 115/65 98.6  F (37  C) Oral 72 - 18 -     Gen:  Resting comfortably, NAD  CV:  RRR, no murmur  Pulm:  CTAB, no wheezes  Abd:  Soft, appropriately ttp, non-distended.Fundus at the umbilicus, firm and non-tender.  Incision: covered by a bandage which is clean  Ext:  non-tender, trace LE edema b/l    I/O last 3 completed shifts:  In: 900 [I.V.:900]  Out: 1026 [Urine:100; Blood:926]    Hgb:   Hemoglobin   Date Value Ref Range Status   2018 12.8 11.7 - 15.7 g/dL Final       Assessment/Plan:  Lois Nunez is a 32 year old  on POD #1 s/p  STAT PLTCS for category II FHT, RFD. Her pregnancy was complicated by anhydramnios, heterozygous for factor V Leiden and asthma.     - HIV, HepB and rubella unknown, will order labs today  - continue with routine postpartum management  Pain: Well-controlled with PO pain medications  Hgb: 12.8>> AM Hgb pending, asymptomatic from acute blood loss anemia. Will discharge with PO iron if hgb returns <10  Rh: positive  Rubella: unk  Feed: breast  BC: Natural family planning  Dispo: DC likely PPD#2-3    # Pain Assessment:   Current Pain Score 3/15/2018 3/15/2018 3/15/2018   Patient currently in pain? denies yes yes   Pain location - Incision Incision   Pain descriptors - Sore Sore   - Lois is experiencing pain due to  section. Pain management was discussed and the plan was created in a collaborative fashion.  Lois's response to the current recommendations: compliant  - Please see the plan for pain management as documented above      Ines Stewart MD PhD  Ob/Gyn PGY-2  3/15/2018 6:41 AM    The patient was seen and examined by me separately from the team.  I have reviewed and agree with the above note.  She is feeling well today, would be open to TAP blocks.  Baby is stable in the NICU, off oxygen, working on feedings.  Discussed recommendation for Lovenox prophylaxis postpartum/post op for 6 weeks, she will think about it.  Continue routine post op care.    Nubia Nix MD, FACOG

## 2018-03-15 NOTE — OP NOTE
[facility]  Full Operative Progress Note     Surgery Date:             3/14/2018  Surgeon:                     Paola Avila MD  Assistants:                 Ines Stewart MD PhD PGY-2                                                Chasity Best, MS3  Pre-op Diagnosis:                     -  at 42w0d  - Category II FHT, remote from delivery  - Oligohydramnios  - uncomplicated Asthma     Post-op Diagnosis:                   - Same   - Liveborn female infant   - suspected abruption     Procedure:                  Primary low-transverse  section with double layer uterine closure via Pfannensteil incision     Anesthesia:                GETA  QBL:               926 mL  IVF:                 1200 mL crystalloid  UOP:              100 mL clear urine at the end of the case  Drains: Duffy catheter   Specimens:                Placenta, cord segment  Complications:                       None     Indications:   Lois Nunez is a 32 year old  at 42w0d admitted to the [facility] service for IOL due to anhydramnios. She was a transfer of care from a home birth [facility] service.  She developed a category II fetal heart tracing with prolonged decels with asher to the 60's. The had a prolonged decel with fetal bradycardic to the 60's that did not return to baseline after 5 minutes. Emergent delivery via  section was recommended.  The risks, benefits, and alternatives of  section were discussed with the patient. She was verbal consented and she agreed to proceed.     Findings:   Vigorous female infant born on 3/14/2017 at 1951 by  section. APGARs 7/8 at 1 and 5 minutes, weighing 3640 g. Cephalic presentation, LOT position. Meconium amniotic fluid. Loose nuchal cord delivered through. Placenta delivered quickly and intact with 3-vessel cord. Suspect possible abruption. Normal appearing uterus, ovaries and fallopian tubes. No abdominal wall or intraabdominal  adhesions.        Procedure Details:   The patient was brought to the OR, where adequate GETA anesthesia was administered.  She was placed in the dorsal supine position with a slight leftward tilt. She was prepped and draped in the usual sterile fashion. A surgical time out was performed. A pfannenstiel skin incision was made with the scalpel, and carried down to the underlying fascia with sharp and blunt dissection. The fascia was incised in the midline, and the incision was extended bluntly. The fascia was bluntly dissected off the rectus muscles.  The rectus muscles were  in the midline, and the peritoneum was entered bluntly, and the opening was extended with digital pressure. The bladder blade was placed. A transverse hysterotomy was made with the scalpel in the lower uterine segment, and the incision was extended with digital pressure. The infant was noted to be in the LOTposition, and was delivered atraumatically. The shoulders delivered easily. A loose nuchal cord was noted. After 60 second delay, the cord was doubly clamped and cut, and the infant was handed off to the awaiting nursery staff. A segment of cord was cut. The placenta was delivered with gentle traction on the umbilical cord and uterine massage. The uterus was exteriorized and cleared of all clots and debris. Uterine tone was noted to be boggy with 20 units of pitocin given through the running IV and uterine massage. She was given a dose of methergine.  The hysterotomy was closed with a running locked suture of 0 Monocryl.  The hysterotomy was then imbricated using an 0 Monocryl suture. The hysterotomy was noted to be hemostatic. The posterior cul-de-sac was irrigated and cleared of all clots and debris. The uterus was returned to the abdomen. The pericolic gutters were irrigated and cleared of all clots and debris. The hysterotomy was reexamined and noted to be hemostatic. The fascia and rectus muscles were examined and areas of  oozing were controlled with electrocautery. The fascia was closed with a running 0 Vicryl suture. The subcutaneous tissue was irrigated and areas of oozing were controlled with electrocautery. The skin was closed with 4-0 monocryle and covered with steristirps, and a sterile dressing.      Patient transferred to PACU in stable condition.    Dr. Avila was scrubbed and present for entire procedure.     Ines Stewart MD PhD  OB/GYN Resident, PGY-2  3/14/2018 8:51 PM       Staff MD Note  I was present and scrubbed for the entire procedure noted above.  I agree with the description above and any necessary changes have been made by me.  Paola Avila MD  3/19/2018  9:13 AM

## 2018-03-15 NOTE — BRIEF OP NOTE
[facility]   Brief Operative Note     Surgery Date: 3/14/2018  Surgeon:  Paola Avila MD  Assistants:  Ines Stewart MD PhD PGY-2    Chasity Best, MS3  Pre-op Diagnosis:    -  at 42w0d  - Category II FHT, remote from delivery  - Oligohydramnios  - uncomplicated Asthma     Post-op Diagnosis:    - Same   - Liveborn female infant   - suspected abruption    Procedure:  Primary low-transverse  section with double layer uterine closure via Pfannensteil incision    Anesthesia: GETA  QBL:  926 mL  IVF:  1200 mL crystalloid  UOP:  100 mL clear urine at the end of the case  Drains: Duffy catheter   Specimens:  Placenta, cord segment  Complications: None     Findings:   Vigorous female infant born on 3/14/2017 at 1950 by  section. APGARs 7/8 at 1 and 5 minutes, weighing pending. Cephalic presentation, LOT position. Meconium amniotic fluid. Loose nuchal cord delivered through. Placenta delivered quickly and intact with 3-vessel cord. Suspect possible abruption. Normal appearing uterus, ovaries and fallopian tubes. No abdominal wall or intraabdominal adhesions.     Disposition:  Stable to PACU    Ines Stewart MD PhD  OB/GYN Resident, PGY-2  3/14/2018 8:46 PM

## 2018-03-15 NOTE — LACTATION NOTE
"D:  I met with Kristina.  She is normally in good health, takes no medications, and has no history of breast/chest surgery or trauma. She mentioned history of gallstones. She breast fed her first two children for 15 months, needing to supplement with donor milk with her daughter. She has already started to pump.   I:  I gave her a folder of introductory materials, reviewed physiology of colostrum and milk production, pumping guidelines, and I gave her a log and encouraged her to use it.   I explained how to access the videos \"Hand Expression\" and \"Maximizing Milk Production\"; as well as other helpful books and websites.   We discussed hands-on pumping techniques and usefulness of a hands-free pumping bra which she has.  We discussed skin to skin holding and how to reach breastfeeding goals.  We talked about medications during breastfeeding.  She verbalized understanding.  I advised her to call her insurance company about pump coverage as she would prefer a different pump (Hygia) than her insurance gives out here. I also observed a breastfeeding. We discussed supportive hold, positioning, latch, breastfeeding patterns and infant driven feeding, breast support and compressions, use/rationale of the nipple shield, skin to skin benefits, and timing of pumpings around breastfeedings.  She had initially latched her baby and reported suckling for 15 minutes. During this visit, baby was frantic so we talked about calming her before latch by having her suck on a finger; she latched briefly but was frantic again. I fitted her with a 20mm shield and instructed her in its use. Baby latched, suckled briefly and then fell asleep. We discussed option to finger feed small volumes as IV fluids are weaned as supplement after breastfeeding. She agreed to donor milk use as bridge, but she plans to breastfeed ad yaritza first.  A: Experienced mom working on breastfeeding, has information she needs to initiate her supply.  P:  Will continue to " provide lactation support.  Senia Montalvo, RNC, IBCLC

## 2018-03-15 NOTE — PLAN OF CARE
Problem: Patient Care Overview  Goal: Plan of Care/Patient Progress Review  Outcome: Improving  Patient has getting good pain control with current pain medication. Able to ambulate to the bathroom with stand by assist. She is voiding freely post bautista. Going down in NICU by wheelchair this morning and noon until baby transferred to unit this evening. She was breastfeeding while in NICU and pumping as well with minimal amount of colostrum being collected.  Was able to latch baby well after transfer with minimal assist but other wise she is independent.  Will continue with plan of care.

## 2018-03-15 NOTE — ANESTHESIA CARE TRANSFER NOTE
Patient: Lois Nunez    Procedure(s):   - Wound Class: II-Clean Contaminated    Diagnosis: Pregnancy  Diagnosis Additional Information: No value filed.    Anesthesia Type:   No value filed.     Note:  Airway :Face Mask  Patient transferred to:PACU  Comments: .Anesthesia Care Transfer Note    Patient: Lois Nunez    Transferred to: PACU    Patient vital signs: stable    Airway: none    Monitors applied, VSS.  Patient awake and comfortable, breathing spontaneously.  Report given to RN with transfer of care.        Anne-Marie Hdz CRNA  3/14/2018  8:56 PM  Handoff Report: Identifed the Patient, Identified the Reponsible Provider, Reviewed the pertinent medical history, Discussed the surgical course, Reviewed Intra-OP anesthesia mangement and issues during anesthesia, Set expectations for post-procedure period and Allowed opportunity for questions and acknowledgement of understanding      Vitals: (Last set prior to Anesthesia Care Transfer)    CRNA VITALS  3/14/2018 2014 - 3/14/2018 2056      3/14/2018             Resp Rate (observed): (!)  1                Electronically Signed By: PA Pena CRNA  March 14, 2018  8:56 PM

## 2018-03-15 NOTE — PLAN OF CARE
Patient arrived to [facility] unit via zoom cart at 0000,with belongings, accompanied by spouse/ significant other. Stopped at NICU prior to transferring to [facility]. Received report from Soumya SMITH RN. Unit and room orientation started. Call light given; no concerns present at this time. Continue with plan of care.

## 2018-03-15 NOTE — PLAN OF CARE
Data: Lois Nunez transferred to postpartum room 7144 via cart at 0000. Baby in NICU. Parents visited infant in NICU after leaving PACU and prior to transferring to postpartum.  Action: Receiving unit notified of transfer: Yes. Patient and family notified of room change. Report given to Klaudia GRADY at bedside. Belongings sent to receiving unit. Accompanied by Registered Nurse. Oriented patient to surroundings. Call light within reach.  Response: Patient tolerated transfer and is stable.

## 2018-03-15 NOTE — DISCHARGE SUMMARY
[facility] Discharge Summary    Lois Nunez MRN# [id number]   Age: 32 year old YOB: 1985     Date of Admission:  3/14/2018  Date of Discharge:  3/16/2018   Admitting Physician:  PA Cabral [facility]  Discharge Physician:  Amita Danielson MD    Admit Dx:   -  at 42w0d  - Anhydramnios  - uncomplicated Asthma  - Factor V Leiden, heterozygous     Discharge Dx:  - Same as above, s/p primary low transverse  section  - Category II FHT, remote from delivery  - suspected abruption    Procedures:  - primary low transverse  section with double layer uterine closure via Pfannenstiel incision  - GETA analgesia  - TAP block    Admit HPI:  Lois Nunez is a 32 year old  at 42w0d admitted to the [facility] service for IOL due to anhydramnios. She was a transfer of care from a home birth [facility] service.  She developed a category II fetal heart tracing with prolonged decels with asher to the 60's. The had a prolonged decel with fetal bradycardic to the 60's that did not return to baseline after 5 minutes. Emergent delivery via  section was recommended.  The risks, benefits, and alternatives of  section were discussed with the patient. She was verbal consented and she agreed to proceed.      Please see her admit H&P for full details of her PMH, PSH, Meds, Allergies and exam on admit.    Operative Course:  Surgery was uncomplicated. QBL from the delivery was 926 ml. Please see her  Section Operative Note for full details regarding her delivery.    Operative Findings:   Vigorous female infant born on 3/14/2017 at 1950 by  section. APGARs 7/8 at 1 and 5 minutes, weighing 3640 g. Cephalic presentation, LOT position. Meconium amniotic fluid. Loose nuchal cord delivered through. Placenta delivered quickly and intact with 3-vessel cord. Suspect possible abruption. Normal appearing uterus, ovaries and fallopian tubes. No  abdominal wall or intraabdominal adhesions.        Postoperative Course:  Her postoperative course was uncomplicated. On POD#2, she was meeting all of her postpartum goals and deemed stable for discharge. She was voiding without difficulty, tolerating a regular diet without nausea and vomiting, her pain was well controlled on oral pain medicines and her lochia was appropriate. Her hemoglobin prior to delivery was 12.8 and after delivery was 10.5. Her Rh status was positive and Rhogam was not indicated. Due to factor 5 Leiden heterozygote with family history of clotting and postpartum postoperative status, lovenox was recommended postpartum.    Discharge Medications:     Review of your medicines      START taking       Dose / Directions    acetaminophen 325 MG tablet   Commonly known as:  TYLENOL        Dose:  325-650 mg   Take 1-2 tablets (325-650 mg) by mouth every 6 hours as needed for mild pain   Quantity:  100 tablet   Refills:  0       enoxaparin 40 MG/0.4ML injection   Commonly known as:  LOVENOX   Used for:  Heterozygous factor V Leiden mutation (H)        Dose:  40 mg   Inject 0.4 mLs (40 mg) Subcutaneous every 24 hours   Quantity:  42 Syringe   Refills:  0       ibuprofen 600 MG tablet   Commonly known as:  ADVIL/MOTRIN        Dose:  600 mg   Take 1 tablet (600 mg) by mouth every 6 hours as needed for other (carmping)   Quantity:  100 tablet   Refills:  0       oxyCODONE IR 5 MG tablet   Commonly known as:  ROXICODONE        Dose:  5-10 mg   Take 1-2 tablets (5-10 mg) by mouth every 4 hours as needed for other (pain control or improvement in physical function. Hold dose for analgesic side effects.)   Quantity:  15 tablet   Refills:  0       senna-docusate 8.6-50 MG per tablet   Commonly known as:  SENOKOT-S;PERICOLACE        Dose:  2 tablet   Take 2 tablets by mouth 2 times daily as needed for constipation   Quantity:  100 tablet   Refills:  0         STOP taking          HYDROcodone-acetaminophen 5-500 MG  per tablet   Commonly known as:  VICODIN           MIRENA (52 MG) 20 MCG/24HR IUD   Generic drug:  levonorgestrel                Where to get your medicines      These medications were sent to [facility] [place], [place] - [address]  [address], [facility] [address]     Phone:  [phone]      acetaminophen 325 MG tablet     enoxaparin 40 MG/0.4ML injection     ibuprofen 600 MG tablet     senna-docusate 8.6-50 MG per tablet         Some of these will need a paper prescription and others can be bought over the counter. Ask your nurse if you have questions.     Bring a paper prescription for each of these medications      oxyCODONE IR 5 MG tablet                 Discharge/Disposition:  Lois Nunez was discharged to home in stable condition with the following instructions/medications:  1) Call for temperature > 100.4, bright red vaginal bleeding >1 pad an hour x 2 hours, foul smelling vaginal discharge, pain not controlled by usual oral pain meds, persistent nausea and vomiting not controlled on medications, drainage or redness from incision site  2) She desired natural family planning for contraception.  3) For feeding she decided to breast feed.  4) She was instructed to follow-up with her CNM as well as OBGYN  5) Discharge activity:  No heavy lifting >15 lbs or strenuous activity for 6 weeks, pelvic rest for 6 weeks, no driving or operating machinery while on narcotics.    # Discharge Pain Plan:   - During her hospitalization, Lois experienced pain due to  section.  The pain plan for discharge was discussed with Lois and the plan was created in a collaborative fashion.    - Pharmacologic adjuvants:  Oxycodone, NSAID, acetamenophen    Trina Gottlieb  OB GYN PGY-3    Appreciate note by Dr. Gottlieb. Patient has been seen and examined by me separate from the resident, agree with above note.     Jazmine Feldman MD  10:16 AM

## 2018-03-15 NOTE — PROGRESS NOTES
Received referral for SW to see regarding baby in NICU.      Consulted with NICU physicians.  Baby is doing well and NICU admission is anticipated to be brief.   Baby may be able to transfer to [facility] later this evening.       No indication for NICU psychosocial assessment at this time.      No further SW intervention anticipated.  Please refer again if needs/concerns arise prior to discharge.

## 2018-03-15 NOTE — ANESTHESIA POSTPROCEDURE EVALUATION
Patient: Lois Nunez    Procedure(s):   - Wound Class: II-Clean Contaminated    Diagnosis:Pregnancy  Diagnosis Additional Information: No value filed.    Anesthesia Type:  General, ETT, RSI    Note:  Anesthesia Post Evaluation    Patient location during evaluation: PACU  Patient participation: Able to fully participate in evaluation  Level of consciousness: awake and alert  Pain management: adequate  Airway patency: patent  Cardiovascular status: acceptable  Respiratory status: acceptable  Hydration status: acceptable  PONV: none     Anesthetic complications: None          Last vitals:  Vitals:    03/14/18 2115 03/14/18 2130 03/14/18 2134   BP: 129/77 120/75 120/75   Pulse:      Resp:  (!) 6 11   Temp:  36.5  C (97.7  F)    SpO2: 96% 98%          Electronically Signed By: Ivan Leyva MD  March 14, 2018  9:38 PM

## 2018-03-15 NOTE — PLAN OF CARE
Problem: Patient Care Overview  Goal: Plan of Care/Patient Progress Review  Outcome: Improving  PP assessment WNL. Vital signs stable. Pt up ad yaritza with stand by assistance U/2 firm small flow. IV is saline locked. Intake and output appropriate. Duffy remains in place. Pain managed tonight with Toradol, tylenol, and 5mg Roxicodone. Pt down to NICU tonight via wheelchair to breastfeed infant. Utilizing double electric breast pump and hand expression as well. , Jonathon in room-positive support system. Will continue with pt plan of care.

## 2018-03-15 NOTE — PROVIDER NOTIFICATION
03/15/18 0132   Provider Notification   Provider Name/Title Dr. Stewart   Method of Notification Electronic Page   Request Evaluate-Remote   Notification Reason Other   Would you like labs for pt HIV and Hepatitis B status drawn in AM since they are unknown? Also Rubella status is unknown.     Thanks!

## 2018-03-15 NOTE — PROGRESS NOTES
Blood pressure 115/65, pulse 72, temperature 98.6  F (37  C), temperature source Oral, resp. rate 18.    Called to room for FHR tracing. FHR deceleration to 60's x 8 minutes with slow recovery to baseline. Moderate variability throughout.  O2 on and pt repositioned to hands and knees position. IV fluid bolus started.   CONTRACTIONS: mild and every 2-3 minutes  Pitocin- none,  Antibiotics- none  ROM: not ruptured  PELVIC EXAM: deferred    ASSESSMENT:  ==============  IUP @ 42w0d for induction of labor.  Indication: anyhydramnios   Fetal Heart Rate Tracing category two  GBS- not done    PLAN:  ===========  Continue intrauterine resuscitative measures as needed.   Discussed labor induction with Pitocin with FHR Category 1. Pt agreeable to plan.  MD consultant on call Dr. Avila updated and aware of decelerations    PA Bonner CNM

## 2018-03-15 NOTE — ANESTHESIA PREPROCEDURE EVALUATION
Anesthesia Evaluation     . Pt has had prior anesthetic.     No history of anesthetic complications          ROS/MED HX    ENT/Pulmonary:     (+)Intermittent asthma , . .   (-) tobacco use and COPD   Neurologic:      (-) CVA, TIA and Neuropathy   Cardiovascular:        (-) hypertension, CAD, irregular heartbeat/palpitations and stent   METS/Exercise Tolerance:     Hematologic:        (-) anemia   Musculoskeletal:         GI/Hepatic:        (-) GERD and liver disease   Renal/Genitourinary:      (-) renal disease   Endo:      (-) Type I DM, Type II DM and thyroid disease   Psychiatric:         Infectious Disease:  - neg infectious disease ROS       Malignancy:         Other:                     Physical Exam  Normal systems: dental    Airway   Mallampati: II  TM distance: >3 FB  Neck ROM: full    Dental     Cardiovascular   Rhythm and rate: regular and normal      Pulmonary              HPI: Lois Nunez is a  32 year old female with an IUP at 42w0d complicated by prolonged decels.    History and physical reviewed; no interval change.    Procedure: Procedure(s):   - Wound Class: II-Clean Contaminated       PMHx/PSHx/ROS:  Past Medical History:   Diagnosis Date     Uncomplicated asthma        Past Surgical History:   Procedure Laterality Date     LAPAROSCOPIC ABLATION ENDOMETRIOSIS              Anesthesia Plan      History & Physical Review  History and physical reviewed and following examination; no interval change.    ASA Status:  2 emergent.    NPO Status:  Waived due to emergency    Plan for General, ETT and RSI with Intravenous induction. Maintenance will be Balanced.    PONV prophylaxis:  Ondansetron (or other 5HT-3) and Dexamethasone or Solumedrol  Additional equipment: Videolaryngoscope      Postoperative Care  Postoperative pain management:  Oral pain medications and Multi-modal analgesia.      Consents  Anesthetic plan, risks, benefits and alternatives discussed with:  Patient..        Ivan HALEY  MD Gordon  9:35 PM March 14, 2018

## 2018-03-15 NOTE — PROGRESS NOTES
Recurrent prolonged decelerations to 60-70's, moderate variability throughout. Discussed concern for fetal status and recommendation for emergent  at this time with patient and FOB. Agreeable to plan. Care transferred to MD team for code .    -Mynor Sanchez, PA CASTELLANOM

## 2018-03-16 ENCOUNTER — APPOINTMENT (OUTPATIENT)
Dept: ULTRASOUND IMAGING | Facility: CLINIC | Age: 33
End: 2018-03-16
Attending: OBSTETRICS & GYNECOLOGY
Payer: COMMERCIAL

## 2018-03-16 PROCEDURE — 93971 EXTREMITY STUDY: CPT | Mod: RT

## 2018-03-16 PROCEDURE — 12000028 ZZH R&B OB UMMC

## 2018-03-16 PROCEDURE — 25000128 H RX IP 250 OP 636: Performed by: STUDENT IN AN ORGANIZED HEALTH CARE EDUCATION/TRAINING PROGRAM

## 2018-03-16 PROCEDURE — 25000132 ZZH RX MED GY IP 250 OP 250 PS 637: Performed by: STUDENT IN AN ORGANIZED HEALTH CARE EDUCATION/TRAINING PROGRAM

## 2018-03-16 RX ORDER — OXYCODONE HYDROCHLORIDE 5 MG/1
5-10 TABLET ORAL EVERY 4 HOURS PRN
Qty: 15 TABLET | Refills: 0 | Status: SHIPPED | OUTPATIENT
Start: 2018-03-16 | End: 2022-05-17

## 2018-03-16 RX ORDER — ACETAMINOPHEN 325 MG/1
325-650 TABLET ORAL EVERY 6 HOURS PRN
Qty: 100 TABLET | Refills: 0 | Status: SHIPPED | OUTPATIENT
Start: 2018-03-16 | End: 2018-03-16

## 2018-03-16 RX ORDER — OXYCODONE HYDROCHLORIDE 5 MG/1
5-10 TABLET ORAL EVERY 4 HOURS PRN
Qty: 15 TABLET | Refills: 0 | Status: SHIPPED | OUTPATIENT
Start: 2018-03-16 | End: 2018-03-16

## 2018-03-16 RX ORDER — AMOXICILLIN 250 MG
2 CAPSULE ORAL 2 TIMES DAILY PRN
Qty: 100 TABLET | Refills: 0 | Status: SHIPPED | OUTPATIENT
Start: 2018-03-16 | End: 2022-05-17

## 2018-03-16 RX ORDER — ACETAMINOPHEN 325 MG/1
325-650 TABLET ORAL EVERY 6 HOURS PRN
Qty: 100 TABLET | Refills: 0 | Status: SHIPPED | OUTPATIENT
Start: 2018-03-16 | End: 2022-05-17

## 2018-03-16 RX ORDER — IBUPROFEN 600 MG/1
600 TABLET, FILM COATED ORAL EVERY 6 HOURS PRN
Qty: 100 TABLET | Refills: 0 | Status: SHIPPED | OUTPATIENT
Start: 2018-03-16 | End: 2022-05-17

## 2018-03-16 RX ORDER — AMOXICILLIN 250 MG
2 CAPSULE ORAL 2 TIMES DAILY PRN
Qty: 100 TABLET | Refills: 0 | Status: SHIPPED | OUTPATIENT
Start: 2018-03-16 | End: 2018-03-16

## 2018-03-16 RX ORDER — IBUPROFEN 600 MG/1
600 TABLET, FILM COATED ORAL EVERY 6 HOURS PRN
Qty: 100 TABLET | Refills: 0 | Status: SHIPPED | OUTPATIENT
Start: 2018-03-16 | End: 2018-03-16

## 2018-03-16 RX ADMIN — OXYCODONE HYDROCHLORIDE 10 MG: 5 TABLET ORAL at 16:28

## 2018-03-16 RX ADMIN — IBUPROFEN 800 MG: 800 TABLET, FILM COATED ORAL at 03:42

## 2018-03-16 RX ADMIN — OXYCODONE HYDROCHLORIDE 5 MG: 5 TABLET ORAL at 14:04

## 2018-03-16 RX ADMIN — ACETAMINOPHEN 975 MG: 325 TABLET, FILM COATED ORAL at 05:16

## 2018-03-16 RX ADMIN — OXYCODONE HYDROCHLORIDE 10 MG: 5 TABLET ORAL at 07:14

## 2018-03-16 RX ADMIN — OXYCODONE HYDROCHLORIDE 5 MG: 5 TABLET ORAL at 13:19

## 2018-03-16 RX ADMIN — ENOXAPARIN SODIUM 40 MG: 40 INJECTION SUBCUTANEOUS at 10:08

## 2018-03-16 RX ADMIN — OXYCODONE HYDROCHLORIDE 10 MG: 5 TABLET ORAL at 19:30

## 2018-03-16 RX ADMIN — OXYCODONE HYDROCHLORIDE 10 MG: 5 TABLET ORAL at 23:00

## 2018-03-16 RX ADMIN — ACETAMINOPHEN 975 MG: 325 TABLET, FILM COATED ORAL at 13:20

## 2018-03-16 RX ADMIN — SENNOSIDES AND DOCUSATE SODIUM 2 TABLET: 8.6; 5 TABLET ORAL at 20:08

## 2018-03-16 RX ADMIN — SIMETHICONE CHEW TAB 80 MG 80 MG: 80 TABLET ORAL at 08:20

## 2018-03-16 RX ADMIN — OXYCODONE HYDROCHLORIDE 10 MG: 5 TABLET ORAL at 03:41

## 2018-03-16 RX ADMIN — SIMETHICONE CHEW TAB 80 MG 80 MG: 80 TABLET ORAL at 03:41

## 2018-03-16 RX ADMIN — IBUPROFEN 800 MG: 800 TABLET, FILM COATED ORAL at 16:28

## 2018-03-16 RX ADMIN — ACETAMINOPHEN 975 MG: 325 TABLET, FILM COATED ORAL at 21:11

## 2018-03-16 RX ADMIN — OXYCODONE HYDROCHLORIDE 10 MG: 5 TABLET ORAL at 01:01

## 2018-03-16 RX ADMIN — IBUPROFEN 800 MG: 800 TABLET, FILM COATED ORAL at 10:07

## 2018-03-16 RX ADMIN — SENNOSIDES AND DOCUSATE SODIUM 2 TABLET: 8.6; 5 TABLET ORAL at 08:20

## 2018-03-16 RX ADMIN — IBUPROFEN 800 MG: 800 TABLET, FILM COATED ORAL at 23:00

## 2018-03-16 RX ADMIN — OXYCODONE HYDROCHLORIDE 10 MG: 5 TABLET ORAL at 10:13

## 2018-03-16 NOTE — PROGRESS NOTES
[facility]   Post-partum Note    Name:  Lois Nunez  MRN: [id number]    S: Patient is feeling well this morning.  Pain is mostly controlled but would like to work on pain control today.  Denies dizziness, chest pain, SOB, nausea or vomiting. Tolerating regular diet without nausea or vomiting.  Ambulating without dizziness.  Spontaneously voiding. Reports flatus.  Lochia is less than menses.  Breast feeding.  Plans natural family planning for postpartum contraception. She is agreeable to lovenox treatment due to her heterozygous facto V leiden status.     O:   Patient Vitals for the past 24 hrs:   BP Temp Temp src Pulse Resp SpO2   18 0343 101/64 98.4  F (36.9  C) Oral 67 20 -   03/15/18 1630 114/72 98.4  F (36.9  C) Oral 63 16 100 %   03/15/18 1250 107/59 98.6  F (37  C) Oral 62 16 99 %   03/15/18 0735 113/68 98.4  F (36.9  C) Oral 73 18 100 %     Gen:  Resting comfortably, NAD  CV:  RRR, no murmur  Pulm:  CTAB, no wheezes  Abd:  Soft, appropriately ttp, non-distended.Fundus at the umbilicus, firm and non-tender.  Incision: c/d/i no surrounding redness or erythema  Ext:  non-tender, trace LE edema b/l    I/O last 3 completed shifts:  In: -   Out:  [Urine:; Blood:25]    Hgb:   Hemoglobin   Date Value Ref Range Status   03/15/2018 10.5 (L) 11.7 - 15.7 g/dL Final       Assessment/Plan:  Lois Nunez is a 32 year old  on POD #2 s/p STAT PLTCS for category II FHT, RFD. Her pregnancy was complicated by anhydramnios, heterozygous for factor V Leiden and asthma.      Factor V Leiden heterozygous:  - no personal history of DVT or abnormal clotting  - accepted 6 weeks of lovenox PP  - encouraged to follow up with OB PP    Postpartum cares:  - continue with routine postpartum management  Pain:               Mostly controlled with PO pain medications  Hgb:                12.8>> 10.5, asymptomatic from acute blood loss anemia  Rh:                  positive  Rubella:   Immune  PNC labs:  HIV neg and HepB NR  Feed:              breast  BC:                 Natural family planning  Dispo:             DC likely today or tomorrow    # Pain Assessment:   Current Pain Score 3/16/2018 3/16/2018 3/15/2018   Patient currently in pain? yes yes yes   Pain location Incision Incision -   Pain descriptors Aching Constant;Aching -   - Lois is experiencing pain due to  section. Pain management was discussed and the plan was created in a collaborative fashion.  Lois's response to the current recommendations: compliant  - Please see the plan for pain management as documented above    Ines Stewart MD PhD  Ob/Gyn PGY-2  3/16/2018 6:45 AM    I personally examined and evaluated Lois Nunez on 3/16/2018 independently from the resident.  I discussed the patient with the team and agree with the assessment and plan of care as documented in the above note with edits by me. Additional comments: Lois noting RLE pain this morning; exam notable for palpable, what appears to be superficial, venous thrombosis. Will obtain US to rule out DVT given postop state and FVL heterozygosity. Also noting ongoing pain, though managed sufficiently with medications. Plan likely DC to home POD#3, but pending above testing.     Amita Danielson MD  10:18 AM

## 2018-03-16 NOTE — PLAN OF CARE
Problem: Patient Care Overview  Goal: Plan of Care/Patient Progress Review  Outcome: Declining  Patient is moving better, positioning herself comfortably during breastfeeding.  She is also pumping and doing hand expression with result. Pain on right leg is still present but US showed absence of thrombus. Will continue with plan of care.

## 2018-03-16 NOTE — PROGRESS NOTES
I was asked to evaluate Lois for post-operative pain control given that she is now POD2 (3/16) s/p emergency c/s. Of note, prior to said unplanned c/s, the patient was not consented for a TAP block. Today she shared with her nursing staff and OBGYN primary team that she is experiencing significant abdominal discomfort and expressed interest in a TAP block post-operatively. Importantly, it has been >24 hours since her c/s.    On interview, the patient reports that at rest her pain is a 2/10, but worsens significantly with ambulation. Specifically, she confidently states that her pain is deeper, non-incisional-type pain. She denies radiation, weakness.    The efficacy of TAP blocks with Exparel for post-op pain tapers off after >24 hours. Also, given that the patient's pain is likely visceral >>> somatic, we suspect that the utility of a TAP block for this patient's post-op pain would be relatively low. This was discussed with the patient and she was agreeable with conservative analgesic management including PO meds.    -----------------------------------  Oscar Paz DO, MSc  Anesthesia Resident, PGY2

## 2018-03-16 NOTE — PROVIDER NOTIFICATION
Text page to : Pt. would like TAP block.  she is in quite a bit of pain.  She awoke 1 hour after oxycodone was available in excruciating pain.  10 mg of oxy given 20 minutes ago.

## 2018-03-16 NOTE — PLAN OF CARE
Problem: Patient Care Overview  Goal: Plan of Care/Patient Progress Review  Outcome: Improving  PP assessment WNL. Vital signs stable. Pt up ad yaritza, steady gait. Intake and output remains appropriate. Encouraged fluid intake. Pain managed with Ibuprofen, oxycodone, and tylenol for incisional pain-see MAR. Utilizing double electric breast pump and hand expression when infant is not interested in latching.  Hand express and feed back after every feed. Incision bandage remains in place no drainage noted, pt stated she didn't feel she could shower until morning. No further concerns, will continue with pt plan of care.

## 2018-03-16 NOTE — PROVIDER NOTIFICATION
03/16/18 0925   Provider Notification   Provider Name/Title Dr Cao   Paged for pain at back of right leg.

## 2018-03-17 VITALS
DIASTOLIC BLOOD PRESSURE: 64 MMHG | SYSTOLIC BLOOD PRESSURE: 111 MMHG | HEART RATE: 82 BPM | TEMPERATURE: 97.9 F | RESPIRATION RATE: 16 BRPM | OXYGEN SATURATION: 100 %

## 2018-03-17 PROCEDURE — 25000128 H RX IP 250 OP 636: Performed by: STUDENT IN AN ORGANIZED HEALTH CARE EDUCATION/TRAINING PROGRAM

## 2018-03-17 PROCEDURE — 25000132 ZZH RX MED GY IP 250 OP 250 PS 637: Performed by: STUDENT IN AN ORGANIZED HEALTH CARE EDUCATION/TRAINING PROGRAM

## 2018-03-17 RX ADMIN — ENOXAPARIN SODIUM 40 MG: 40 INJECTION SUBCUTANEOUS at 11:19

## 2018-03-17 RX ADMIN — OXYCODONE HYDROCHLORIDE 10 MG: 5 TABLET ORAL at 01:45

## 2018-03-17 RX ADMIN — ACETAMINOPHEN 975 MG: 325 TABLET, FILM COATED ORAL at 05:03

## 2018-03-17 RX ADMIN — OXYCODONE HYDROCHLORIDE 10 MG: 5 TABLET ORAL at 09:34

## 2018-03-17 RX ADMIN — IBUPROFEN 800 MG: 800 TABLET, FILM COATED ORAL at 05:03

## 2018-03-17 RX ADMIN — OXYCODONE HYDROCHLORIDE 10 MG: 5 TABLET ORAL at 05:03

## 2018-03-17 RX ADMIN — SENNOSIDES AND DOCUSATE SODIUM 1 TABLET: 8.6; 5 TABLET ORAL at 09:34

## 2018-03-17 NOTE — PLAN OF CARE
Problem: Patient Care Overview  Goal: Plan of Care/Patient Progress Review  Outcome: Improving  PT. Verbalizes pain is well controlled with scheduled Tylenol, Ibuprofen prn given like it is scheduled, and OXY prn.  Pt. Is voiding spontaneously, up ad yaritza.  She is looking forward to going home today!

## 2018-03-17 NOTE — PLAN OF CARE
Problem: Patient Care Overview  Goal: Plan of Care/Patient Progress Review  Outcome: Adequate for Discharge Date Met: 03/17/18  Data: Vital signs stable, assessments within normal limits.   Breastfeeding independently, tolerated and retained.   Up ad yaritza, voiding freely. Incision healing well.   Discharge outcomes on care plan met, patient instructed of signs/symptoms to look for and report per discharge instructions.    No apparent pain.  Action: Review of care plan, teaching, and discharge instructions done with patient. Infant identification with ID bands done, patient verification with signature obtained.  Response: Patient states understanding and comfort with self cares and feeding. All questions addressed. Patient discharged with infant @ 1130am.

## 2018-03-17 NOTE — PROGRESS NOTES
[facility]   Post-partum Note    Name:  Lois Nunez  MRN: [id number]    S: Patient is feeling well this morning.  Pain well controlled.  Denies dizziness, chest pain, SOB, nausea or vomiting. Tolerating regular diet without nausea or vomiting.  Ambulating without dizziness.  Spontaneously voiding. Reports flatus.  Lochia is less than menses.  Breast feeding.  Plans natural family planning for postpartum contraception. She is agreeable to lovenox treatment due to her heterozygous facto V leiden status.     O:   Patient Vitals for the past 24 hrs:   BP Temp Temp src Pulse Heart Rate Resp   18 120/72 98.3  F (36.8  C) Oral - 68 16   18 1630 113/65 98.2  F (36.8  C) Oral 82 - 18   18 0820 111/58 98  F (36.7  C) Oral 87 - -   18 0343 101/64 98.4  F (36.9  C) Oral 67 - 20     Gen:  Resting comfortably, NAD  CV:  RRR, no murmur  Pulm:  CTAB, no wheezes  Abd:  Soft, appropriately ttp, non-distended.Fundus at the umbilicus, firm and non-tender.  Incision: c/d/i no surrounding redness or erythema  Ext:  non-tender, trace LE edema b/l    I/O last 3 completed shifts:  In: -   Out: 200 [Urine:200]    Hgb:   Hemoglobin   Date Value Ref Range Status   03/15/2018 10.5 (L) 11.7 - 15.7 g/dL Final       Assessment/Plan:  Lois Nunez is a 32 year old  on POD #3 s/p STAT PLTCS for category II FHT, RFD. Her pregnancy was complicated by anhydramnios, heterozygous for factor V Leiden and asthma.      Factor V Leiden heterozygous: Planning 6 weeks postpartum lovenox  Pain:                Home on current regimen  Hgb:                 12.8>> 10.5, asymptomatic from acute blood loss anemia  PNC:  Rh pos, rub immune - no MMR or rhogam indicated  Feed:               Breast, continue supportive cares  BC:                  Natural family planning  Dispo:              Home today    # Pain Assessment:   Current Pain Score 3/16/2018 3/16/2018 3/16/2018   Patient currently  in pain? denies yes yes   Pain location - Uterine Incision   Pain descriptors - Aching;Sore -   - Lois is experiencing pain due to  section. Pain management was discussed and the plan was created in a collaborative fashion.  Lois's response to the current recommendations: compliant  - Please see the plan for pain management as documented above    Trina Gottlieb MD  OB GYN PGY-3  3/16/2018  11:38 PM      Appreciate note by Dr. Gottlieb. Patient has been seen and examined by me separate from the resident, agree with above note.     Jazmine Feldman MD  10:16 AM

## 2018-03-18 ENCOUNTER — TELEPHONE (OUTPATIENT)
Dept: OBGYN | Facility: CLINIC | Age: 33
End: 2018-03-18

## 2018-03-18 RX ORDER — OXYCODONE HYDROCHLORIDE 5 MG/1
5 TABLET ORAL EVERY 4 HOURS PRN
Qty: 18 TABLET | Refills: 0 | Status: SHIPPED | OUTPATIENT
Start: 2018-03-18 | End: 2018-03-18

## 2018-03-18 RX ORDER — OXYCODONE HYDROCHLORIDE 5 MG/1
5 TABLET ORAL EVERY 4 HOURS PRN
Qty: 10 TABLET | Refills: 0 | Status: SHIPPED | OUTPATIENT
Start: 2018-03-18 | End: 2022-05-17

## 2018-03-18 RX ORDER — OXYCODONE HYDROCHLORIDE 5 MG/1
5 TABLET ORAL EVERY 4 HOURS PRN
Qty: 10 TABLET | Refills: 0 | Status: SHIPPED | OUTPATIENT
Start: 2018-03-18 | End: 2018-03-18

## 2018-03-18 NOTE — TELEPHONE ENCOUNTER
Received call from pt. She did not receive TAP block and is POD#3 s/p stat PLTCS for cat 2 - she was a planned home delivery.   Used 2 tabs oxycodone q4-6 hours and is now out of the Rx she filled yesterday.  No fevers, chills, large clots, opening or drainage from incision.   Using tylenol and ibuprofen.   Did not understand she wasn't supposed to take more than 6 tabs a day, is worried she is taking too much now.      Will send 10 tabs to  of L&D. Reviewed s/s for indications for return to care, and encouraged opioid taper. Reviewed optimization of non narcotic options first.    Patient understands and agrees with plan of care, all questions answered.   The patient was discussed with Dr. Carr who is in agreement with the treatment plan.      Trina Gottlieb MD  OB GYN PGY-3

## 2018-04-09 LAB — COPATH REPORT: NORMAL

## 2019-03-09 ENCOUNTER — HEALTH MAINTENANCE LETTER (OUTPATIENT)
Age: 34
End: 2019-03-09

## 2020-03-02 ENCOUNTER — HEALTH MAINTENANCE LETTER (OUTPATIENT)
Age: 35
End: 2020-03-02

## 2020-06-26 NOTE — DISCHARGE INSTRUCTIONS
Called Patient to let him know that he will need to have a Covid Test within 72 hours prior to his appointment to see Dr. Gomez on 8/13/20.  Patient has full understanding and will mail appointment to Patient.  Order In.   Postop  Birth Instructions    Activity       Do not lift more than 10 pounds for 6 weeks after surgery.  Ask family and friends for help when you need it.    No driving until you have stopped taking your pain medications (usually two weeks after surgery).    No heavy exercise or activity for 6 weeks.  Don't do anything that will put a strain on your surgery site.    Don't strain when using the toilet.  Your care team may prescribe a stool softener if you have problems with your bowel movements.     To care for your incision:       Keep the incision clean and dry.    Do not soak your incision in water. No swimming or hot tubs until it has fully healed. You may soak in the bathtub if the water level is below your incision.    Do not use peroxide, gel, cream, lotion, or ointment on your incision.    Adjust your clothes to avoid pressure on your surgery site (check the elastic in your underwear for example).     You may see a small amount of clear or pink drainage and this is normal.  Check with your health care provider:       If the drainage increases or has an odor.    If the incision reddens, you have swelling, or develop a rash.    If you have increased pain and the medicine we prescribed doesn't help.    If you have a fever above 100.4 F (38 C) with or without chills when placing thermometer under your tongue.   The area around your incision (surgery wound), will feel numb.  This is normal. The numbness should go away in less than a year.     Keep your hands clean:  Always wash your hands before touching your incision (surgery wound). This helps reduce your risk of infection. If your hands aren't dirty, you may use an alcohol hand-rub to clean your hands. Keep your nails clean and short.    Call your healthcare provider if you have any of these symptoms:       You soak a sanitary pad with blood within 1 hour, or you see blood clots larger than a golf ball.    Bleeding that lasts more than 6  weeks.    Vaginal discharge that smells bad.    Severe pain, cramping or tenderness in your lower belly area.    A need to urinate more frequently (use the toilet more often), more urgently (use the toilet very quickly), or it burns when you urinate.    Nausea and vomiting.    Redness, swelling or pain around a vein in your leg.    Problems breastfeeding or a red or painful area on your breast.    Chest pain and cough or are gasping for air.    Problems with coping with sadness, anxiety or depression. If you have concerns about hurting yourself or the baby, call your provider immediately.      You have questions or concerns after you return home.

## 2020-08-07 ENCOUNTER — TRANSCRIBE ORDERS (OUTPATIENT)
Dept: MATERNAL FETAL MEDICINE | Facility: CLINIC | Age: 35
End: 2020-08-07

## 2020-08-07 ENCOUNTER — TELEPHONE (OUTPATIENT)
Dept: MATERNAL FETAL MEDICINE | Facility: CLINIC | Age: 35
End: 2020-08-07

## 2020-08-07 DIAGNOSIS — O26.90 PREGNANCY RELATED CONDITION, ANTEPARTUM: Primary | ICD-10-CM

## 2020-08-07 NOTE — TELEPHONE ENCOUNTER
Referral received from Ivana Keating at [facility] for consult secondary to last baby born post dates after failed BPP via emergency c/s. Referral was received but no prenatal records were sent. VM left on Ivana's phone asking her to return my call to [facility] office - will need prenatal records to be faxed for full consult review.    Henrietta Pulliam RN

## 2020-08-14 ENCOUNTER — TELEPHONE (OUTPATIENT)
Dept: MATERNAL FETAL MEDICINE | Facility: CLINIC | Age: 35
End: 2020-08-14

## 2020-08-14 NOTE — TELEPHONE ENCOUNTER
Call from pt PCP Ivana Keating. Ivana states she would like pt to have a consult to discuss where pt should deliver due to her last delivery. At this time they do not wish for an ultrasound just a MFM consult. Erin Olson RN

## 2020-08-17 ENCOUNTER — TRANSCRIBE ORDERS (OUTPATIENT)
Dept: MATERNAL FETAL MEDICINE | Facility: CLINIC | Age: 35
End: 2020-08-17

## 2020-08-17 DIAGNOSIS — O26.90 PREGNANCY RELATED CONDITION, ANTEPARTUM: Primary | ICD-10-CM

## 2020-08-25 NOTE — PROGRESS NOTES
RE: Rajan Esparza  : 1985  MRUN: [id number]    2020    Dear Ivana Keating CNM:    Thank you for referring your patient Ms. Esparza for a Maternal-Fetal Medicine consultation today.  As you know, she is a 35 year old  at 9 weeks and 4 days gestation by LMP.    She came to me today to discuss recommendations as she has a history of suspected abruption necessitating an emergency low transverse  delivery at 42w1d in 2018.  She would like advice for how to optimize her chances of a vaginal delivery. She would like advice on avoiding the conditions which led to her emergency  section.    Her pregnancy is otherwise complicated by Factor V Leiden heterozygosity without history of thrombotic events.     Obstetrical History:    OB History    Para Term  AB Living   4 3 3 0 0 3   SAB TAB Ectopic Multiple Live Births   0 0 0 0 3      # Outcome Date GA Lbr Matthew/2nd Weight Sex Delivery Anes PTL Lv   4 Current            3 Term 18 42w0d  3.61 kg (7 lb 15.3 oz) F CS-LTranv   [facility]      Name: MILLIE ESPARZA RAJAN      Apgar1: 7  Apgar5: 8   2 Term 2015 40w3d   F  None  [facility]   1 Term 2013 38w3d   M    [facility]     Medical History:   Past Medical History:   Diagnosis Date     Uncomplicated asthma      Surgical History:   Past Surgical History:   Procedure Laterality Date      SECTION N/A 3/14/2018    Procedure:  SECTION;;  Surgeon: Paola Avila MD;  Location: [facility] L[facility]     LAPAROSCOPIC ABLATION ENDOMETRIOSIS       Medications:     Current Outpatient Medications:      Bioflavonoid Products (VITAMIN C) CHEW, , Disp: , Rfl:      Prenatal Vit-Fe Fumarate-FA (PRENATAL VITAMIN PO), Take 1 tablet by mouth daily, Disp: , Rfl:      acetaminophen (TYLENOL) 325 MG tablet, Take 1-2 tablets (325-650 mg) by mouth every 6 hours as needed for mild pain (Patient not taking: Reported on 2020), Disp: 100 tablet, Rfl: 0     enoxaparin (LOVENOX) 40 MG/0.4ML  injection, Inject 0.4 mLs (40 mg) Subcutaneous every 24 hours (Patient not taking: Reported on 2020), Disp: 42 Syringe, Rfl: 0     ibuprofen (ADVIL/MOTRIN) 600 MG tablet, Take 1 tablet (600 mg) by mouth every 6 hours as needed for other (carmping) (Patient not taking: Reported on 2020), Disp: 100 tablet, Rfl: 0     oxyCODONE IR (ROXICODONE) 5 MG tablet, Take 1 tablet (5 mg) by mouth every 4 hours as needed for pain maximum 6 tablet(s) per day (Patient not taking: Reported on 2020), Disp: 10 tablet, Rfl: 0     oxyCODONE IR (ROXICODONE) 5 MG tablet, Take 1-2 tablets (5-10 mg) by mouth every 4 hours as needed for other (pain control or improvement in physical function. Hold dose for analgesic side effects.) (Patient not taking: Reported on 2020), Disp: 15 tablet, Rfl: 0     senna-docusate (SENOKOT-S;PERICOLACE) 8.6-50 MG per tablet, Take 2 tablets by mouth 2 times daily as needed for constipation (Patient not taking: Reported on 2020), Disp: 100 tablet, Rfl: 0     Allergies:   Allergies   Allergen Reactions     Amoxicillin Hives     As a child     Codeine Sulfate GI Disturbance     Erythromycin GI Disturbance     Review of Systems:  - Negative other than noted in HPI    Data Reviewed:  - placental pathology: term, appropriate villi, meconium, focal amnion necrosis, three vessel cord    Prenatal labs were not available for review during this appointment    Physical examination was deferred at this time.    In light of the patient s history as listed above my recommendations can be summarized briefly as follows:    History of  section with possible suspected abruption: we discussed the operative findings and fetal heart rate tracing prior to her emergent  delivery in 2018. While placental abruption is one possible cause of the observed fetal heart rate tracing, much more likely is anhydramnios and her 42 week + gestational age. Thre were no findings suspect for abruption reported  in operative note such as retroplacental clot or blood stained amniotic fluid. We discussed that she is without risk factors for placental abruption including hypertension, preeclampsia, polyhydramnios with rupture, trauma, cocaine or tobacco use. The most likely cause of her needing a  section is pregnancy duration beyond 41 weeks with associated absent amniotic fluid. This is also a readily modifiable risk factor. We discussed that term gestational age is considered to be between 37 and 41 weeks and that after 39 weeks, adverse pregnancy outcomes begin to increase. There is a sharp increase in adverse outcomes after 41 weeks including increased risk of  section, meconium, and fetal demise. For this reason, we routinely recommend delivery at or before 41 weeks for all pregnant women, regardless of prior pregnancy outcomes.     Lois desires to TOLAC. We stressed the importance of delivery in a hospital. Given her history of one primary low transverse , her risk of uterine rupture or dehiscence is low at 1-2%. However, uterine rupture may be life threatening to bother mother and fetus. Thus, delivery in a hospital is required and should be strongly encouraged. Lois expressed her understanding of this recommendation.     Recommendations:  - Continue TOLAC discussion throughout pregnancy with primary OBGYN provider. Labor and delivery in the hospital setting.  - Monitor amniotic fluid level and fetal well being with weekly BPP beginning at 37 weeks.     At the end of our discussion, Ms. Nunez indicated that her questions were answered and she seemed satisfied with our discussion.  Thank you for the opportunity to participate in your patient s care.  If we can be of any further assistance, please do not hesitate to contact our office.    Winnie Matos MD  Obstetrics and Gynecology, PGY-2  20. 12:37 PM    Physician Attestation   I, Jesus Crowder, saw and evaluated Lois Nunez with  the resident/fellow.      I have reviewed and discussed with Dr. Matos the patient history, physical exam and plan of care. I personally reviewed the vital signs, medications, lab results and imaging.    Key history or physical exam findings: history of CD at postdates secondary to anhydramnios and fetal heart rate decelerations in labor.    Key management decisions made: recommend delivery by 41 weeks with weekly surveillance of amniotic fluid by BPP prior to delivery beginning at 36-37 weeks. We do not support option for home delivery with history of prior CD. We do not agree with attempting TOLAC at home.    Jesus Crowder  Date of Service (when I saw the patient): 08/27/20    Time Spent on this Encounter   I, Jesus Crowder, spent a total of 20 minutes in face to face consultation today managing the care of Lois Nunez.  Over 50% of my time on the unit was spent counseling the patient and /or coordinating care regarding management of pregnancy and recommendations for delivery. See note for details.

## 2020-08-26 ENCOUNTER — PRE VISIT (OUTPATIENT)
Dept: MATERNAL FETAL MEDICINE | Facility: CLINIC | Age: 35
End: 2020-08-26

## 2020-08-27 ENCOUNTER — OFFICE VISIT (OUTPATIENT)
Dept: MATERNAL FETAL MEDICINE | Facility: CLINIC | Age: 35
End: 2020-08-27
Attending: MIDWIFE
Payer: MEDICAID

## 2020-08-27 DIAGNOSIS — O26.90 PREGNANCY RELATED CONDITION, ANTEPARTUM: ICD-10-CM

## 2020-08-27 DIAGNOSIS — O34.219 HISTORY OF CESAREAN DELIVERY, CURRENTLY PREGNANT: Primary | ICD-10-CM

## 2020-08-27 NOTE — NURSING NOTE
Lois presents to [facility] for [facility] consult due to history of stat c/section at 42 weeks, anhydramnios, and possible abruption. Pt referred to [facility] by home birth center. Pt would like to discuss likelihood of recurrence of same situation and what can she do to avoid situation from happening again. Dr. Crowder and Dr. Matos into see patient.      Gricel Taylor RN

## 2020-10-14 ENCOUNTER — RECORDS - HEALTHEAST (OUTPATIENT)
Dept: LAB | Facility: CLINIC | Age: 35
End: 2020-10-14

## 2020-10-14 LAB
BASOPHILS # BLD AUTO: 0 THOU/UL (ref 0–0.2)
BASOPHILS NFR BLD AUTO: 0 % (ref 0–2)
EOSINOPHIL # BLD AUTO: 0.2 THOU/UL (ref 0–0.4)
EOSINOPHIL NFR BLD AUTO: 2 % (ref 0–6)
ERYTHROCYTE [DISTWIDTH] IN BLOOD BY AUTOMATED COUNT: 13.4 % (ref 11–14.5)
HCT VFR BLD AUTO: 35.8 % (ref 35–47)
HGB BLD-MCNC: 12 G/DL (ref 12–16)
HIV 1+2 AB+HIV1 P24 AG SERPL QL IA: NEGATIVE
IMM GRANULOCYTES # BLD: 0.1 THOU/UL
IMM GRANULOCYTES NFR BLD: 1 %
LYMPHOCYTES # BLD AUTO: 1.8 THOU/UL (ref 0.8–4.4)
LYMPHOCYTES NFR BLD AUTO: 18 % (ref 20–40)
MCH RBC QN AUTO: 30.7 PG (ref 27–34)
MCHC RBC AUTO-ENTMCNC: 33.5 G/DL (ref 32–36)
MCV RBC AUTO: 92 FL (ref 80–100)
MONOCYTES # BLD AUTO: 0.6 THOU/UL (ref 0–0.9)
MONOCYTES NFR BLD AUTO: 6 % (ref 2–10)
NEUTROPHILS # BLD AUTO: 7 THOU/UL (ref 2–7.7)
NEUTROPHILS NFR BLD AUTO: 73 % (ref 50–70)
PLATELET # BLD AUTO: 197 THOU/UL (ref 140–440)
PMV BLD AUTO: 10.7 FL (ref 8.5–12.5)
RBC # BLD AUTO: 3.91 MILL/UL (ref 3.8–5.4)
WBC: 9.6 THOU/UL (ref 4–11)

## 2020-10-15 LAB
ABO/RH(D): NORMAL
ABORH REPEAT: NORMAL
ANTIBODY SCREEN: NEGATIVE
BACTERIA SPEC CULT: NO GROWTH
HBV SURFACE AG SERPL QL IA: NEGATIVE
HCV AB SERPL QL IA: NEGATIVE
RUBV IGG SERPL QL IA: POSITIVE
T PALLIDUM AB SER QL: NEGATIVE

## 2020-10-16 LAB
C TRACH DNA SPEC QL PROBE+SIG AMP: NEGATIVE
N GONORRHOEA DNA SPEC QL NAA+PROBE: NEGATIVE

## 2020-12-14 ENCOUNTER — HEALTH MAINTENANCE LETTER (OUTPATIENT)
Age: 35
End: 2020-12-14

## 2021-03-18 ENCOUNTER — RECORDS - HEALTHEAST (OUTPATIENT)
Dept: LAB | Facility: CLINIC | Age: 36
End: 2021-03-18

## 2021-03-19 LAB
ALLERGIC TO PENICILLIN: YES
GP B STREP DNA SPEC QL NAA+PROBE: NEGATIVE

## 2021-03-24 ENCOUNTER — RECORDS - HEALTHEAST (OUTPATIENT)
Dept: ADMINISTRATIVE | Facility: OTHER | Age: 36
End: 2021-03-24

## 2021-04-18 ENCOUNTER — HEALTH MAINTENANCE LETTER (OUTPATIENT)
Age: 36
End: 2021-04-18

## 2021-10-02 ENCOUNTER — HEALTH MAINTENANCE LETTER (OUTPATIENT)
Age: 36
End: 2021-10-02

## 2022-04-11 ENCOUNTER — TRANSFERRED RECORDS (OUTPATIENT)
Dept: HEALTH INFORMATION MANAGEMENT | Facility: CLINIC | Age: 37
End: 2022-04-11
Payer: COMMERCIAL

## 2022-05-12 ENCOUNTER — TRANSCRIBE ORDERS (OUTPATIENT)
Dept: OTHER | Age: 37
End: 2022-05-12
Payer: COMMERCIAL

## 2022-05-12 DIAGNOSIS — K80.20 GALLSTONES: Primary | ICD-10-CM

## 2022-05-14 ENCOUNTER — HEALTH MAINTENANCE LETTER (OUTPATIENT)
Age: 37
End: 2022-05-14

## 2022-05-17 ENCOUNTER — OFFICE VISIT (OUTPATIENT)
Dept: SURGERY | Facility: CLINIC | Age: 37
End: 2022-05-17
Attending: FAMILY MEDICINE
Payer: COMMERCIAL

## 2022-05-17 ENCOUNTER — HOSPITAL ENCOUNTER (OUTPATIENT)
Facility: HOSPITAL | Age: 37
End: 2022-05-17
Attending: SURGERY | Admitting: SURGERY
Payer: COMMERCIAL

## 2022-05-17 VITALS
DIASTOLIC BLOOD PRESSURE: 78 MMHG | SYSTOLIC BLOOD PRESSURE: 100 MMHG | BODY MASS INDEX: 23.48 KG/M2 | WEIGHT: 149.6 LBS | HEIGHT: 67 IN

## 2022-05-17 DIAGNOSIS — K80.20 GALLSTONES: ICD-10-CM

## 2022-05-17 DIAGNOSIS — K81.1 CHRONIC CHOLECYSTITIS: Primary | ICD-10-CM

## 2022-05-17 DIAGNOSIS — Z11.59 ENCOUNTER FOR SCREENING FOR OTHER VIRAL DISEASES: Primary | ICD-10-CM

## 2022-05-17 PROCEDURE — 99204 OFFICE O/P NEW MOD 45 MIN: CPT | Performed by: SURGERY

## 2022-05-17 RX ORDER — MELOXICAM 15 MG/1
TABLET ORAL
COMMUNITY
Start: 2022-05-14

## 2022-05-17 RX ORDER — HYDROCODONE BITARTRATE AND ACETAMINOPHEN 5; 325 MG/1; MG/1
1 TABLET ORAL EVERY 6 HOURS PRN
Qty: 18 TABLET | Refills: 0 | Status: ON HOLD | OUTPATIENT
Start: 2022-05-17 | End: 2022-05-20

## 2022-05-17 RX ORDER — DOCUSATE SODIUM 100 MG/1
100 CAPSULE, LIQUID FILLED ORAL 2 TIMES DAILY
Qty: 30 CAPSULE | Refills: 0 | Status: SHIPPED | OUTPATIENT
Start: 2022-05-17

## 2022-05-17 RX ORDER — HEPARIN SODIUM 5000 [USP'U]/.5ML
5000 INJECTION, SOLUTION INTRAVENOUS; SUBCUTANEOUS
Status: CANCELLED | OUTPATIENT
Start: 2022-05-17

## 2022-05-17 RX ORDER — INDOCYANINE GREEN AND WATER 25 MG
2.5 KIT INJECTION ONCE
Status: CANCELLED | OUTPATIENT
Start: 2022-05-17 | End: 2022-05-17

## 2022-05-17 NOTE — LETTER
5/17/2022         RE: Lois Nunez  [address] [address]        Dear Colleague,    Thank you for referring your patient, Lois Nunez, to the [facility] SURGERY CLINIC AND BARIATRICS [facility]. Please see a copy of my visit note below.    General Surgery H&P  Lois Nunez MRN# [id number]   Age/Sex: 36 year old female YOB: 1985     Reason for visit: 1. Chronic cholecystitis    2. Gallstones            Referring physician: Chema Pickett MD                   Assessment and Plan:   Assessment:  1.  Chronic cholecystitis  2.  Gallstones    The patient is presenting with what appears to be chronic cholecystitis.  This has been ongoing for over 8 years.  Per the patient, she had an outside HIDA scan that revealed that the gallbladder was not visible during the test.  This test likely represented acute on chronic cholecystitis.  Ultrasound does show that she has multiple gallstones.    Plan:  -Offer the patient robotic assisted laparoscopic cholecystectomy, possible open  -The risks and benefits of the procedure were explained detail to the patient. The risks include infection, bleeding, damage to the surrounding structures. Patient verbalized understanding provided consent to undergo the procedure above.            Chief Complaint:     Chief Complaint   Patient presents with     Consult     Gallstones - scans in chart        History is obtained from the patient    HPI:   oLis Nunez is a 36 year old female who presents to the surgery team today with complaints of abdominal pain.  Patient states abdominal pain located in the right upper quadrant.  Patient is ongoing for the last 8 years.  Patient has been dealing with this pain by limitations of certain foods and continue with supplementation to help with the pain.  Patient states that she has ongoing intermittent pain in the right upper quadrant.  No fevers no chills.  No nausea or vomiting at this time.   "Is noted to have endometriosis surgery in the past laparoscopically.  Also the patient is noted to have a heterozygous factor V Leiden disease.  Never had a blood clot.  Patient is not on any antiplatelet or anticoagulation for this disease          Past Medical History:     Past Medical History:   Diagnosis Date     Uncomplicated asthma               Past Surgical History:     Past Surgical History:   Procedure Laterality Date      SECTION N/A 3/14/2018    Procedure:  SECTION;;  Surgeon: Paola Avila MD;  [facility]: [facility]     LAPAROSCOPIC ABLATION ENDOMETRIOSIS               Social History:    reports that she has never smoked. She has never used smokeless tobacco. She reports previous alcohol use. She reports that she does not use drugs.           Family History:     Family History   Problem Relation Age of Onset     Diabetes Maternal Grandmother      Heart Disease Maternal Grandmother      Cancer - colorectal Paternal Grandmother      Heart Disease Paternal Grandfather               Allergies:     Allergies   Allergen Reactions     Acetaminophen-Codeine      Other reaction(s): GI Upset     Amoxicillin Hives     As a child     Codeine Sulfate GI Disturbance     Erythromycin GI Disturbance     Sulfadiazine      Other reaction(s): GI Upset     Penicillins Rash     Other reaction(s): rash              Medications:     Prior to Admission medications    Medication Sig Start Date End Date Taking? Authorizing Provider   Bioflavonoid Products (VITAMIN C) CHEW    Yes Reported, Patient   L-Glutamine 500 MG CAPS 1 capsule   Yes Reported, Patient   meloxicam (MOBIC) 15 MG tablet TAKE 1 TABLET BY MOUTH EVERY DAY FOR 7 DAYS 22  Yes Reported, Patient              Review of Systems:   A 12 point Review of Systems is negative other than noted in the HPI            Physical Exam:     Patient Vitals for the past 24 hrs:   BP Height Weight   22 1402 100/78 1.702 m (5' 7\") 67.9 kg (149 lb 9.6 oz)    "     [unfilled]   Constitutional:   awake, alert, cooperative, no apparent distress, and appears stated age       Eyes:   PERRL, conjunctiva/corneas clear, EOM's intact; no scleral edema or icterus noted        ENT:   Normocephalic, without obvious abnormality, atraumatic, Lips, mucosa, and tongue normal        Hematologic / Lymphatic:   No lymphadenopathy       Lungs:   Normal respiratory effort, no accessory muscle use, breath sounds bilaterally on auscultation       Cardiovascular:   Regular rate and rhythm       Abdomen:   Soft, nondistended, nontender to palpation, no Espinoza sign       Musculoskeletal:   No obvious swelling, bruising or deformity       Skin:   Skin color and texture normal for patient, no rashes or lesions              Data:            DO Arnulfo Huertas DO  General Surgeon  [facility]  Surgery [address] [address]?  Office: [phone]  Employed by - [facility]  Pager: [phone]             Again, thank you for allowing me to participate in the care of your patient.        Sincerely,        Arnulfo Keith DO

## 2022-05-17 NOTE — PROGRESS NOTES
General Surgery H&P  Lois Nunez MRN# [id number]   Age/Sex: 36 year old female YOB: 1985     Reason for visit: 1. Chronic cholecystitis    2. Gallstones            Referring physician: Chema Pickett MD                   Assessment and Plan:   Assessment:  1.  Chronic cholecystitis  2.  Gallstones    The patient is presenting with what appears to be chronic cholecystitis.  This has been ongoing for over 8 years.  Per the patient, she had an outside HIDA scan that revealed that the gallbladder was not visible during the test.  This test likely represented acute on chronic cholecystitis.  Ultrasound does show that she has multiple gallstones.    Plan:  -Offer the patient robotic assisted laparoscopic cholecystectomy, possible open  -The risks and benefits of the procedure were explained detail to the patient. The risks include infection, bleeding, damage to the surrounding structures. Patient verbalized understanding provided consent to undergo the procedure above.            Chief Complaint:     Chief Complaint   Patient presents with     Consult     Gallstones - scans in chart        History is obtained from the patient    HPI:   Lois Nunez is a 36 year old female who presents to the surgery team today with complaints of abdominal pain.  Patient states abdominal pain located in the right upper quadrant.  Patient is ongoing for the last 8 years.  Patient has been dealing with this pain by limitations of certain foods and continue with supplementation to help with the pain.  Patient states that she has ongoing intermittent pain in the right upper quadrant.  No fevers no chills.  No nausea or vomiting at this time.  Is noted to have endometriosis surgery in the past laparoscopically.  Also the patient is noted to have a heterozygous factor V Leiden disease.  Never had a blood clot.  Patient is not on any antiplatelet or anticoagulation for this disease          Past Medical History:     Past  "Medical History:   Diagnosis Date     Uncomplicated asthma               Past Surgical History:     Past Surgical History:   Procedure Laterality Date      SECTION N/A 3/14/2018    Procedure:  SECTION;;  Surgeon: Paola Avila MD;  [facility]: [facility]     LAPAROSCOPIC ABLATION ENDOMETRIOSIS               Social History:    reports that she has never smoked. She has never used smokeless tobacco. She reports previous alcohol use. She reports that she does not use drugs.           Family History:     Family History   Problem Relation Age of Onset     Diabetes Maternal Grandmother      Heart Disease Maternal Grandmother      Cancer - colorectal Paternal Grandmother      Heart Disease Paternal Grandfather               Allergies:     Allergies   Allergen Reactions     Acetaminophen-Codeine      Other reaction(s): GI Upset     Amoxicillin Hives     As a child     Codeine Sulfate GI Disturbance     Erythromycin GI Disturbance     Sulfadiazine      Other reaction(s): GI Upset     Penicillins Rash     Other reaction(s): rash              Medications:     Prior to Admission medications    Medication Sig Start Date End Date Taking? Authorizing Provider   Bioflavonoid Products (VITAMIN C) CHEW    Yes Reported, Patient   L-Glutamine 500 MG CAPS 1 capsule   Yes Reported, Patient   meloxicam (MOBIC) 15 MG tablet TAKE 1 TABLET BY MOUTH EVERY DAY FOR 7 DAYS 22  Yes Reported, Patient              Review of Systems:   A 12 point Review of Systems is negative other than noted in the HPI            Physical Exam:     Patient Vitals for the past 24 hrs:   BP Height Weight   22 1402 100/78 1.702 m (5' 7\") 67.9 kg (149 lb 9.6 oz)        [unfilled]   Constitutional:   awake, alert, cooperative, no apparent distress, and appears stated age       Eyes:   PERRL, conjunctiva/corneas clear, EOM's intact; no scleral edema or icterus noted        ENT:   Normocephalic, without obvious abnormality, atraumatic, " Lips, mucosa, and tongue normal        Hematologic / Lymphatic:   No lymphadenopathy       Lungs:   Normal respiratory effort, no accessory muscle use, breath sounds bilaterally on auscultation       Cardiovascular:   Regular rate and rhythm       Abdomen:   Soft, nondistended, nontender to palpation, no Espinoza sign       Musculoskeletal:   No obvious swelling, bruising or deformity       Skin:   Skin color and texture normal for patient, no rashes or lesions              Data:            DO Arnulfo Huertas DO  General Surgeon  [facility]  Surgery [address] [address]?  Office: [phone]  Employed by - [facility]  Pager: [phone]

## 2022-05-18 ENCOUNTER — MYC MEDICAL ADVICE (OUTPATIENT)
Dept: SURGERY | Facility: CLINIC | Age: 37
End: 2022-05-18
Payer: COMMERCIAL

## 2022-05-20 ENCOUNTER — ANESTHESIA (OUTPATIENT)
Dept: SURGERY | Facility: HOSPITAL | Age: 37
End: 2022-05-20
Payer: COMMERCIAL

## 2022-05-20 ENCOUNTER — ANESTHESIA EVENT (OUTPATIENT)
Dept: SURGERY | Facility: HOSPITAL | Age: 37
End: 2022-05-20
Payer: COMMERCIAL

## 2022-05-20 ENCOUNTER — HOSPITAL ENCOUNTER (OUTPATIENT)
Facility: HOSPITAL | Age: 37
Discharge: HOME | End: 2022-05-20
Attending: EMERGENCY MEDICINE | Admitting: HOSPITALIST
Payer: COMMERCIAL

## 2022-05-20 ENCOUNTER — APPOINTMENT (OUTPATIENT)
Dept: RADIOLOGY | Facility: HOSPITAL | Age: 37
End: 2022-05-20
Attending: SURGERY
Payer: COMMERCIAL

## 2022-05-20 ENCOUNTER — APPOINTMENT (OUTPATIENT)
Dept: ULTRASOUND IMAGING | Facility: HOSPITAL | Age: 37
End: 2022-05-20
Attending: EMERGENCY MEDICINE
Payer: COMMERCIAL

## 2022-05-20 VITALS
HEIGHT: 67 IN | HEART RATE: 52 BPM | RESPIRATION RATE: 16 BRPM | SYSTOLIC BLOOD PRESSURE: 127 MMHG | BODY MASS INDEX: 23.39 KG/M2 | TEMPERATURE: 98.5 F | OXYGEN SATURATION: 98 % | DIASTOLIC BLOOD PRESSURE: 78 MMHG | WEIGHT: 149 LBS

## 2022-05-20 DIAGNOSIS — K81.0 ACUTE CHOLECYSTITIS: ICD-10-CM

## 2022-05-20 DIAGNOSIS — G89.18 POSTOPERATIVE PAIN: Primary | ICD-10-CM

## 2022-05-20 DIAGNOSIS — K81.1 CHRONIC CHOLECYSTITIS: ICD-10-CM

## 2022-05-20 DIAGNOSIS — R10.84 ABDOMINAL PAIN, GENERALIZED: Primary | ICD-10-CM

## 2022-05-20 LAB
ALBUMIN SERPL-MCNC: 4.1 G/DL (ref 3.5–5)
ALBUMIN SERPL-MCNC: 4.4 G/DL (ref 3.5–5)
ALP SERPL-CCNC: 120 U/L (ref 45–120)
ALP SERPL-CCNC: 125 U/L (ref 45–120)
ALT SERPL W P-5'-P-CCNC: 307 U/L (ref 0–45)
ALT SERPL W P-5'-P-CCNC: 501 U/L (ref 0–45)
ANION GAP SERPL CALCULATED.3IONS-SCNC: 11 MMOL/L (ref 5–18)
APAP SERPL-MCNC: 9.8 UG/ML (ref 10–20)
AST SERPL W P-5'-P-CCNC: 469 U/L (ref 0–40)
AST SERPL W P-5'-P-CCNC: 474 U/L (ref 0–40)
ATRIAL RATE - MUSE: 53 BPM
BASOPHILS # BLD AUTO: 0 10E3/UL (ref 0–0.2)
BASOPHILS NFR BLD AUTO: 0 %
BILIRUB DIRECT SERPL-MCNC: 0.4 MG/DL
BILIRUB SERPL-MCNC: 0.8 MG/DL (ref 0–1)
BILIRUB SERPL-MCNC: 1.1 MG/DL (ref 0–1)
BUN SERPL-MCNC: 12 MG/DL (ref 8–22)
CALCIUM SERPL-MCNC: 9.4 MG/DL (ref 8.5–10.5)
CHLORIDE BLD-SCNC: 104 MMOL/L (ref 98–107)
CO2 SERPL-SCNC: 25 MMOL/L (ref 22–31)
CREAT SERPL-MCNC: 0.77 MG/DL (ref 0.6–1.1)
DIASTOLIC BLOOD PRESSURE - MUSE: NORMAL MMHG
EOSINOPHIL # BLD AUTO: 0.1 10E3/UL (ref 0–0.7)
EOSINOPHIL NFR BLD AUTO: 1 %
ERCP: NORMAL
ERYTHROCYTE [DISTWIDTH] IN BLOOD BY AUTOMATED COUNT: 12.8 % (ref 10–15)
GFR SERPL CREATININE-BSD FRML MDRD: >90 ML/MIN/1.73M2
GLUCOSE BLD-MCNC: 97 MG/DL (ref 70–125)
HCG SERPL QL: NEGATIVE
HCT VFR BLD AUTO: 40 % (ref 35–47)
HGB BLD-MCNC: 13.5 G/DL (ref 11.7–15.7)
IMM GRANULOCYTES # BLD: 0 10E3/UL
IMM GRANULOCYTES NFR BLD: 0 %
INTERPRETATION ECG - MUSE: NORMAL
LIPASE SERPL-CCNC: 44 U/L (ref 0–52)
LYMPHOCYTES # BLD AUTO: 1.7 10E3/UL (ref 0.8–5.3)
LYMPHOCYTES NFR BLD AUTO: 23 %
MCH RBC QN AUTO: 30.3 PG (ref 26.5–33)
MCHC RBC AUTO-ENTMCNC: 33.8 G/DL (ref 31.5–36.5)
MCV RBC AUTO: 90 FL (ref 78–100)
MONOCYTES # BLD AUTO: 0.8 10E3/UL (ref 0–1.3)
MONOCYTES NFR BLD AUTO: 11 %
NEUTROPHILS # BLD AUTO: 4.8 10E3/UL (ref 1.6–8.3)
NEUTROPHILS NFR BLD AUTO: 65 %
NRBC # BLD AUTO: 0 10E3/UL
NRBC BLD AUTO-RTO: 0 /100
P AXIS - MUSE: 55 DEGREES
PLATELET # BLD AUTO: 183 10E3/UL (ref 150–450)
POTASSIUM BLD-SCNC: 4 MMOL/L (ref 3.5–5)
PR INTERVAL - MUSE: 152 MS
PROT SERPL-MCNC: 7 G/DL (ref 6–8)
PROT SERPL-MCNC: 7.4 G/DL (ref 6–8)
QRS DURATION - MUSE: 84 MS
QT - MUSE: 412 MS
QTC - MUSE: 386 MS
R AXIS - MUSE: 54 DEGREES
RBC # BLD AUTO: 4.45 10E6/UL (ref 3.8–5.2)
SARS-COV-2 RNA RESP QL NAA+PROBE: NEGATIVE
SODIUM SERPL-SCNC: 140 MMOL/L (ref 136–145)
SYSTOLIC BLOOD PRESSURE - MUSE: NORMAL MMHG
T AXIS - MUSE: 35 DEGREES
VENTRICULAR RATE- MUSE: 53 BPM
WBC # BLD AUTO: 7.5 10E3/UL (ref 4–11)

## 2022-05-20 PROCEDURE — 710N000009 HC RECOVERY PHASE 1, LEVEL 1, PER MIN: Performed by: SURGERY

## 2022-05-20 PROCEDURE — 0FC98ZZ EXTIRPATION OF MATTER FROM COMMON BILE DUCT, VIA NATURAL OR ARTIFICIAL OPENING ENDOSCOPIC: ICD-10-PCS | Performed by: INTERNAL MEDICINE

## 2022-05-20 PROCEDURE — 99222 1ST HOSP IP/OBS MODERATE 55: CPT | Performed by: HOSPITALIST

## 2022-05-20 PROCEDURE — 87635 SARS-COV-2 COVID-19 AMP PRB: CPT | Performed by: EMERGENCY MEDICINE

## 2022-05-20 PROCEDURE — 36415 COLL VENOUS BLD VENIPUNCTURE: CPT | Performed by: EMERGENCY MEDICINE

## 2022-05-20 PROCEDURE — 370N000017 HC ANESTHESIA TECHNICAL FEE, PER MIN: Performed by: SURGERY

## 2022-05-20 PROCEDURE — 250N000011 HC RX IP 250 OP 636: Performed by: NURSE ANESTHETIST, CERTIFIED REGISTERED

## 2022-05-20 PROCEDURE — 84703 CHORIONIC GONADOTROPIN ASSAY: CPT | Performed by: EMERGENCY MEDICINE

## 2022-05-20 PROCEDURE — C9803 HOPD COVID-19 SPEC COLLECT: HCPCS

## 2022-05-20 PROCEDURE — C1769 GUIDE WIRE: HCPCS | Performed by: SURGERY

## 2022-05-20 PROCEDURE — 85025 COMPLETE CBC W/AUTO DIFF WBC: CPT | Performed by: EMERGENCY MEDICINE

## 2022-05-20 PROCEDURE — 47562 LAPAROSCOPIC CHOLECYSTECTOMY: CPT | Mod: AS | Performed by: PHYSICIAN ASSISTANT

## 2022-05-20 PROCEDURE — 250N000011 HC RX IP 250 OP 636: Performed by: ANESTHESIOLOGY

## 2022-05-20 PROCEDURE — 250N000009 HC RX 250: Performed by: NURSE ANESTHETIST, CERTIFIED REGISTERED

## 2022-05-20 PROCEDURE — 99222 1ST HOSP IP/OBS MODERATE 55: CPT | Mod: 57 | Performed by: SURGERY

## 2022-05-20 PROCEDURE — 999N000141 HC STATISTIC PRE-PROCEDURE NURSING ASSESSMENT: Performed by: SURGERY

## 2022-05-20 PROCEDURE — 255N000002 HC RX 255 OP 636: Performed by: INTERNAL MEDICINE

## 2022-05-20 PROCEDURE — 80143 DRUG ASSAY ACETAMINOPHEN: CPT | Performed by: HOSPITALIST

## 2022-05-20 PROCEDURE — 250N000011 HC RX IP 250 OP 636: Performed by: HOSPITALIST

## 2022-05-20 PROCEDURE — 93005 ELECTROCARDIOGRAM TRACING: CPT | Performed by: HOSPITALIST

## 2022-05-20 PROCEDURE — 0FT44ZZ RESECTION OF GALLBLADDER, PERCUTANEOUS ENDOSCOPIC APPROACH: ICD-10-PCS | Performed by: SURGERY

## 2022-05-20 PROCEDURE — 76705 ECHO EXAM OF ABDOMEN: CPT

## 2022-05-20 PROCEDURE — 250N000011 HC RX IP 250 OP 636: Performed by: SURGERY

## 2022-05-20 PROCEDURE — 47562 LAPAROSCOPIC CHOLECYSTECTOMY: CPT | Performed by: SURGERY

## 2022-05-20 PROCEDURE — 999N000180 XR SURGERY CARM FLUORO LESS THAN 5 MIN

## 2022-05-20 PROCEDURE — 99285 EMERGENCY DEPT VISIT HI MDM: CPT | Mod: 25

## 2022-05-20 PROCEDURE — 88304 TISSUE EXAM BY PATHOLOGIST: CPT | Mod: TC | Performed by: SURGERY

## 2022-05-20 PROCEDURE — 36415 COLL VENOUS BLD VENIPUNCTURE: CPT | Performed by: HOSPITALIST

## 2022-05-20 PROCEDURE — 96376 TX/PRO/DX INJ SAME DRUG ADON: CPT

## 2022-05-20 PROCEDURE — 360N000083 HC SURGERY LEVEL 3 W/ FLUORO, PER MIN: Performed by: SURGERY

## 2022-05-20 PROCEDURE — 272N000001 HC OR GENERAL SUPPLY STERILE: Performed by: SURGERY

## 2022-05-20 PROCEDURE — C1726 CATH, BAL DIL, NON-VASCULAR: HCPCS | Performed by: SURGERY

## 2022-05-20 PROCEDURE — 250N000009 HC RX 250: Performed by: SURGERY

## 2022-05-20 PROCEDURE — 250N000025 HC SEVOFLURANE, PER MIN: Performed by: SURGERY

## 2022-05-20 PROCEDURE — 96365 THER/PROPH/DIAG IV INF INIT: CPT | Mod: 59

## 2022-05-20 PROCEDURE — 258N000003 HC RX IP 258 OP 636: Performed by: ANESTHESIOLOGY

## 2022-05-20 PROCEDURE — 96367 TX/PROPH/DG ADDL SEQ IV INF: CPT | Mod: 59

## 2022-05-20 PROCEDURE — 250N000011 HC RX IP 250 OP 636: Performed by: EMERGENCY MEDICINE

## 2022-05-20 PROCEDURE — 83690 ASSAY OF LIPASE: CPT | Performed by: EMERGENCY MEDICINE

## 2022-05-20 PROCEDURE — 710N000012 HC RECOVERY PHASE 2, PER MINUTE: Performed by: SURGERY

## 2022-05-20 PROCEDURE — 258N000003 HC RX IP 258 OP 636: Performed by: HOSPITALIST

## 2022-05-20 PROCEDURE — 82248 BILIRUBIN DIRECT: CPT | Performed by: EMERGENCY MEDICINE

## 2022-05-20 PROCEDURE — 250N000013 HC RX MED GY IP 250 OP 250 PS 637: Performed by: ANESTHESIOLOGY

## 2022-05-20 RX ORDER — NALOXONE HYDROCHLORIDE 0.4 MG/ML
0.4 INJECTION, SOLUTION INTRAMUSCULAR; INTRAVENOUS; SUBCUTANEOUS
Status: DISCONTINUED | OUTPATIENT
Start: 2022-05-20 | End: 2022-05-20 | Stop reason: HOSPADM

## 2022-05-20 RX ORDER — NALOXONE HYDROCHLORIDE 0.4 MG/ML
0.2 INJECTION, SOLUTION INTRAMUSCULAR; INTRAVENOUS; SUBCUTANEOUS
Status: DISCONTINUED | OUTPATIENT
Start: 2022-05-20 | End: 2022-05-20 | Stop reason: HOSPADM

## 2022-05-20 RX ORDER — ONDANSETRON 2 MG/ML
4 INJECTION INTRAMUSCULAR; INTRAVENOUS ONCE
Status: COMPLETED | OUTPATIENT
Start: 2022-05-20 | End: 2022-05-20

## 2022-05-20 RX ORDER — MORPHINE SULFATE 4 MG/ML
4 INJECTION, SOLUTION INTRAMUSCULAR; INTRAVENOUS ONCE
Status: COMPLETED | OUTPATIENT
Start: 2022-05-20 | End: 2022-05-20

## 2022-05-20 RX ORDER — LIDOCAINE 40 MG/G
CREAM TOPICAL
Status: DISCONTINUED | OUTPATIENT
Start: 2022-05-20 | End: 2022-05-20 | Stop reason: HOSPADM

## 2022-05-20 RX ORDER — GLYCOPYRROLATE 0.2 MG/ML
INJECTION, SOLUTION INTRAMUSCULAR; INTRAVENOUS PRN
Status: DISCONTINUED | OUTPATIENT
Start: 2022-05-20 | End: 2022-05-20

## 2022-05-20 RX ORDER — OXYCODONE HYDROCHLORIDE 5 MG/1
5 TABLET ORAL EVERY 6 HOURS PRN
Qty: 12 TABLET | Refills: 0 | Status: SHIPPED | OUTPATIENT
Start: 2022-05-20 | End: 2022-05-23

## 2022-05-20 RX ORDER — MAGNESIUM SULFATE 4 G/50ML
4 INJECTION INTRAVENOUS ONCE
Status: COMPLETED | OUTPATIENT
Start: 2022-05-20 | End: 2022-05-20

## 2022-05-20 RX ORDER — FENTANYL CITRATE 50 UG/ML
INJECTION, SOLUTION INTRAMUSCULAR; INTRAVENOUS PRN
Status: DISCONTINUED | OUTPATIENT
Start: 2022-05-20 | End: 2022-05-20

## 2022-05-20 RX ORDER — ONDANSETRON 2 MG/ML
4 INJECTION INTRAMUSCULAR; INTRAVENOUS EVERY 6 HOURS PRN
Status: DISCONTINUED | OUTPATIENT
Start: 2022-05-20 | End: 2022-05-20 | Stop reason: HOSPADM

## 2022-05-20 RX ORDER — ONDANSETRON 4 MG/1
4 TABLET, ORALLY DISINTEGRATING ORAL EVERY 6 HOURS PRN
Status: DISCONTINUED | OUTPATIENT
Start: 2022-05-20 | End: 2022-05-20 | Stop reason: HOSPADM

## 2022-05-20 RX ORDER — MORPHINE SULFATE 4 MG/ML
4 INJECTION, SOLUTION INTRAMUSCULAR; INTRAVENOUS
Status: DISCONTINUED | OUTPATIENT
Start: 2022-05-20 | End: 2022-05-20 | Stop reason: HOSPADM

## 2022-05-20 RX ORDER — METRONIDAZOLE 500 MG/100ML
500 INJECTION, SOLUTION INTRAVENOUS ONCE
Status: COMPLETED | OUTPATIENT
Start: 2022-05-20 | End: 2022-05-20

## 2022-05-20 RX ORDER — BUPIVACAINE HYDROCHLORIDE AND EPINEPHRINE 2.5; 5 MG/ML; UG/ML
INJECTION, SOLUTION INFILTRATION; PERINEURAL PRN
Status: DISCONTINUED | OUTPATIENT
Start: 2022-05-20 | End: 2022-05-20 | Stop reason: HOSPADM

## 2022-05-20 RX ORDER — SODIUM CHLORIDE, SODIUM LACTATE, POTASSIUM CHLORIDE, CALCIUM CHLORIDE 600; 310; 30; 20 MG/100ML; MG/100ML; MG/100ML; MG/100ML
INJECTION, SOLUTION INTRAVENOUS CONTINUOUS
Status: DISCONTINUED | OUTPATIENT
Start: 2022-05-20 | End: 2022-05-20 | Stop reason: HOSPADM

## 2022-05-20 RX ORDER — SODIUM CHLORIDE 9 MG/ML
INJECTION, SOLUTION INTRAVENOUS CONTINUOUS
Status: DISCONTINUED | OUTPATIENT
Start: 2022-05-20 | End: 2022-05-20 | Stop reason: HOSPADM

## 2022-05-20 RX ORDER — ONDANSETRON 2 MG/ML
4 INJECTION INTRAMUSCULAR; INTRAVENOUS EVERY 30 MIN PRN
Status: DISCONTINUED | OUTPATIENT
Start: 2022-05-20 | End: 2022-05-20 | Stop reason: HOSPADM

## 2022-05-20 RX ORDER — FENTANYL CITRATE 50 UG/ML
25 INJECTION, SOLUTION INTRAMUSCULAR; INTRAVENOUS EVERY 5 MIN PRN
Status: DISCONTINUED | OUTPATIENT
Start: 2022-05-20 | End: 2022-05-20 | Stop reason: HOSPADM

## 2022-05-20 RX ORDER — INDOMETHACIN 50 MG/1
100 SUPPOSITORY RECTAL EVERY 12 HOURS PRN
Status: DISCONTINUED | OUTPATIENT
Start: 2022-05-20 | End: 2022-05-20 | Stop reason: HOSPADM

## 2022-05-20 RX ORDER — IOPAMIDOL 612 MG/ML
INJECTION, SOLUTION INTRAVASCULAR PRN
Status: DISCONTINUED | OUTPATIENT
Start: 2022-05-20 | End: 2022-05-20 | Stop reason: HOSPADM

## 2022-05-20 RX ORDER — FENTANYL CITRATE 50 UG/ML
25 INJECTION, SOLUTION INTRAMUSCULAR; INTRAVENOUS
Status: DISCONTINUED | OUTPATIENT
Start: 2022-05-20 | End: 2022-05-20 | Stop reason: HOSPADM

## 2022-05-20 RX ORDER — PROPOFOL 10 MG/ML
INJECTION, EMULSION INTRAVENOUS PRN
Status: DISCONTINUED | OUTPATIENT
Start: 2022-05-20 | End: 2022-05-20

## 2022-05-20 RX ORDER — LIDOCAINE HYDROCHLORIDE 10 MG/ML
INJECTION, SOLUTION INFILTRATION; PERINEURAL PRN
Status: DISCONTINUED | OUTPATIENT
Start: 2022-05-20 | End: 2022-05-20

## 2022-05-20 RX ORDER — CLINDAMYCIN PHOSPHATE 900 MG/50ML
900 INJECTION, SOLUTION INTRAVENOUS
Status: COMPLETED | OUTPATIENT
Start: 2022-05-20 | End: 2022-05-20

## 2022-05-20 RX ORDER — ONDANSETRON 2 MG/ML
INJECTION INTRAMUSCULAR; INTRAVENOUS PRN
Status: DISCONTINUED | OUTPATIENT
Start: 2022-05-20 | End: 2022-05-20

## 2022-05-20 RX ORDER — PROCHLORPERAZINE 25 MG
25 SUPPOSITORY, RECTAL RECTAL EVERY 12 HOURS PRN
Status: DISCONTINUED | OUTPATIENT
Start: 2022-05-20 | End: 2022-05-20 | Stop reason: HOSPADM

## 2022-05-20 RX ORDER — KETAMINE HYDROCHLORIDE 10 MG/ML
INJECTION INTRAMUSCULAR; INTRAVENOUS PRN
Status: DISCONTINUED | OUTPATIENT
Start: 2022-05-20 | End: 2022-05-20

## 2022-05-20 RX ORDER — OXYCODONE AND ACETAMINOPHEN 5; 325 MG/1; MG/1
1 TABLET ORAL EVERY 6 HOURS PRN
Qty: 12 TABLET | Refills: 0 | Status: SHIPPED | OUTPATIENT
Start: 2022-05-20 | End: 2022-05-20

## 2022-05-20 RX ORDER — ONDANSETRON 4 MG/1
4 TABLET, ORALLY DISINTEGRATING ORAL EVERY 30 MIN PRN
Status: DISCONTINUED | OUTPATIENT
Start: 2022-05-20 | End: 2022-05-20 | Stop reason: HOSPADM

## 2022-05-20 RX ORDER — PROCHLORPERAZINE MALEATE 10 MG
10 TABLET ORAL EVERY 6 HOURS PRN
Status: DISCONTINUED | OUTPATIENT
Start: 2022-05-20 | End: 2022-05-20 | Stop reason: HOSPADM

## 2022-05-20 RX ORDER — HYDROCODONE BITARTRATE AND ACETAMINOPHEN 5; 325 MG/1; MG/1
1-2 TABLET ORAL EVERY 6 HOURS PRN
Qty: 20 TABLET | Refills: 0 | Status: SHIPPED | OUTPATIENT
Start: 2022-05-20 | End: 2022-05-20

## 2022-05-20 RX ORDER — CIPROFLOXACIN 2 MG/ML
400 INJECTION, SOLUTION INTRAVENOUS ONCE
Status: COMPLETED | OUTPATIENT
Start: 2022-05-20 | End: 2022-05-20

## 2022-05-20 RX ORDER — OXYCODONE HYDROCHLORIDE 5 MG/1
5 TABLET ORAL EVERY 4 HOURS PRN
Status: DISCONTINUED | OUTPATIENT
Start: 2022-05-20 | End: 2022-05-20 | Stop reason: HOSPADM

## 2022-05-20 RX ORDER — PROPOFOL 10 MG/ML
INJECTION, EMULSION INTRAVENOUS CONTINUOUS PRN
Status: DISCONTINUED | OUTPATIENT
Start: 2022-05-20 | End: 2022-05-20

## 2022-05-20 RX ORDER — DEXAMETHASONE SODIUM PHOSPHATE 10 MG/ML
INJECTION, SOLUTION INTRAMUSCULAR; INTRAVENOUS PRN
Status: DISCONTINUED | OUTPATIENT
Start: 2022-05-20 | End: 2022-05-20

## 2022-05-20 RX ADMIN — ROCURONIUM BROMIDE 40 MG: 50 INJECTION, SOLUTION INTRAVENOUS at 08:03

## 2022-05-20 RX ADMIN — OXYCODONE HYDROCHLORIDE 5 MG: 5 TABLET ORAL at 13:12

## 2022-05-20 RX ADMIN — FENTANYL CITRATE 50 MCG: 50 INJECTION, SOLUTION INTRAMUSCULAR; INTRAVENOUS at 08:22

## 2022-05-20 RX ADMIN — ROCURONIUM BROMIDE 10 MG: 50 INJECTION, SOLUTION INTRAVENOUS at 08:38

## 2022-05-20 RX ADMIN — FENTANYL CITRATE 50 MCG: 50 INJECTION, SOLUTION INTRAMUSCULAR; INTRAVENOUS at 08:02

## 2022-05-20 RX ADMIN — MORPHINE SULFATE 4 MG: 4 INJECTION INTRAVENOUS at 01:10

## 2022-05-20 RX ADMIN — ONDANSETRON 4 MG: 2 INJECTION INTRAMUSCULAR; INTRAVENOUS at 04:54

## 2022-05-20 RX ADMIN — FENTANYL CITRATE 25 MCG: 50 INJECTION, SOLUTION INTRAMUSCULAR; INTRAVENOUS at 10:39

## 2022-05-20 RX ADMIN — PROPOFOL 50 MG: 10 INJECTION, EMULSION INTRAVENOUS at 09:31

## 2022-05-20 RX ADMIN — ONDANSETRON 4 MG: 2 INJECTION INTRAMUSCULAR; INTRAVENOUS at 01:11

## 2022-05-20 RX ADMIN — LIDOCAINE HYDROCHLORIDE 50 MG: 10 INJECTION, SOLUTION INFILTRATION; PERINEURAL at 08:03

## 2022-05-20 RX ADMIN — SODIUM CHLORIDE, POTASSIUM CHLORIDE, SODIUM LACTATE AND CALCIUM CHLORIDE: 600; 310; 30; 20 INJECTION, SOLUTION INTRAVENOUS at 07:24

## 2022-05-20 RX ADMIN — KETAMINE HYDROCHLORIDE 30 MG: 10 INJECTION, SOLUTION INTRAMUSCULAR; INTRAVENOUS at 08:12

## 2022-05-20 RX ADMIN — SODIUM CHLORIDE: 9 INJECTION, SOLUTION INTRAVENOUS at 03:50

## 2022-05-20 RX ADMIN — PROPOFOL 175 MCG/KG/MIN: 10 INJECTION, EMULSION INTRAVENOUS at 08:07

## 2022-05-20 RX ADMIN — PROPOFOL 170 MG: 10 INJECTION, EMULSION INTRAVENOUS at 08:02

## 2022-05-20 RX ADMIN — SODIUM CHLORIDE, POTASSIUM CHLORIDE, SODIUM LACTATE AND CALCIUM CHLORIDE: 600; 310; 30; 20 INJECTION, SOLUTION INTRAVENOUS at 08:38

## 2022-05-20 RX ADMIN — FENTANYL CITRATE 25 MCG: 50 INJECTION, SOLUTION INTRAMUSCULAR; INTRAVENOUS at 10:21

## 2022-05-20 RX ADMIN — CLINDAMYCIN PHOSPHATE 900 MG: 900 INJECTION, SOLUTION INTRAVENOUS at 07:55

## 2022-05-20 RX ADMIN — MAGNESIUM SULFATE HEPTAHYDRATE 4 G: 80 INJECTION, SOLUTION INTRAVENOUS at 07:25

## 2022-05-20 RX ADMIN — Medication 20 MCG: at 08:19

## 2022-05-20 RX ADMIN — MIDAZOLAM 2 MG: 1 INJECTION INTRAMUSCULAR; INTRAVENOUS at 07:55

## 2022-05-20 RX ADMIN — KETAMINE HYDROCHLORIDE 20 MG: 10 INJECTION, SOLUTION INTRAMUSCULAR; INTRAVENOUS at 08:26

## 2022-05-20 RX ADMIN — METRONIDAZOLE 500 MG: 500 INJECTION, SOLUTION INTRAVENOUS at 05:00

## 2022-05-20 RX ADMIN — SUGAMMADEX 200 MG: 100 INJECTION, SOLUTION INTRAVENOUS at 09:43

## 2022-05-20 RX ADMIN — DEXAMETHASONE SODIUM PHOSPHATE 10 MG: 10 INJECTION, SOLUTION INTRAMUSCULAR; INTRAVENOUS at 08:02

## 2022-05-20 RX ADMIN — INDOMETHACIN 100 MG: 50 SUPPOSITORY RECTAL at 10:12

## 2022-05-20 RX ADMIN — GLYCOPYRROLATE 0.2 MG: 0.2 INJECTION, SOLUTION INTRAMUSCULAR; INTRAVENOUS at 08:04

## 2022-05-20 RX ADMIN — ONDANSETRON 4 MG: 2 INJECTION INTRAMUSCULAR; INTRAVENOUS at 09:37

## 2022-05-20 RX ADMIN — CIPROFLOXACIN 400 MG: 2 INJECTION, SOLUTION INTRAVENOUS at 03:56

## 2022-05-20 ASSESSMENT — ACTIVITIES OF DAILY LIVING (ADL)
ADLS_ACUITY_SCORE: 35

## 2022-05-20 NOTE — ED NOTES
DR Nicolas and DR Callahan informed of patient's bradycardia. Patient dipped into the 40's briefly while resting in bed. She has received IV morphine and Zofran. Her HR is mostly in mid 50's.

## 2022-05-20 NOTE — OP NOTE
Name:  Lois Nunez  PCP:  Piyush Chema Blanca  Procedure Date:  5/20/2022      LAPAROSCOPIC CHOLECYSTECTOMY      Pre-Procedure Diagnosis:  Acute cholecystitis, suspected choledocholithiasis    Post-Procedure Diagnosis:    Suspected choledocholithiasis  Chronic cholecystitis    Surgeon:  Nirmala Mccormack DO    Assist:  Cammie Castro PA-C    Anesthesia Type:    GET    Estimated Blood Loss:   2 cc    Specimens:    Gallbladder       Drains:   None    Complications:    None apparent    Indication for procedure:  This is a 36-year-old female who has been having symptoms of biliary colic.  She was actually set up to have a laparoscopic cholecystectomy next week.  She has now had persistent upper abdominal pain for the last few days.  She was found to have elevated LFTs and a dilated CBD.  It was thought that she had underlying choledocholithiasis.  She has elected for surgical intervention for treatment.    Operative Report:    After informed consent was obtained, and the risks and benefits of the procedure were discussed, the patient was brought back to the operative suite and placed in the supine position.  General endotracheal anesthesia was administered and tolerated well.  A Time Out was held, and the abdomen was prepped and draped in the usual sterile fashion.  Local anesthetic agent was injected into the skin superior and lateral to the umbilicus and an incision made.  A 5mm optical trocar was placed under direct visualization.  Pneumoperitoneum was established to a pressure of 15 mm Hg.  After local infiltration, three more ports were then placed under direct vision in the epigastrium, right subcostal midclavicular line, and right subcostal mid-axillary line.  The gallbladder was identified, the fundus grasped and retracted cephalad. The gallbladder appeared mildly distended and had a thickened surrounding peritoneum.  These findings are consistent with chronic cholecystitis.  The infundibulum was grasped and  retracted laterally, exposing the peritoneum overlying the triangle of Calot. This was dissected bluntly and with hook electrocautery until the cystic duct was clearly identified and freed circumferentially. The same occurred with the cystic artery so that a critical view was obtained.  The junction of the gallbladder and cystic duct was clearly identified and clipped proximally with 3 large clips and on the gallbladder side with 1 clip.  The cystic duct was divided. The cystic artery was similarly clipped and cut.  The gallbladder was dissected from the liver bed in retrograde fashion with the electrocautery. The gallbladder was removed with the assistance of an endocatch bag and extracted from the 11 mm trocar site.  Hemostasis was assured.  The fascia of the 11 mm trocar site was then closed with 0 Vicryl.  Pneumoperitoneum was reduced.  The trocars were removed. The skin was then closed with 4-0 Vicryl, followed by a sterile dressing.    Instrument, sponge, and needle counts were correct at closure and at the conclusion of the case.  Cammie Castro PA-C was utilized as an assistant throughout the entire case for gallbladder manipulation and running the camera.      Disposition:  The patient tolerated the procedure well.  She remains in the operating room for ERCP with Dr. Persaud.      Nirmala Mccormack DO  General Surgeon  [facility]  Surgery [address] [address]  Office: [phone]  Employed by - [employer]

## 2022-05-20 NOTE — ANESTHESIA POSTPROCEDURE EVALUATION
Patient: Lois Nunez    Procedure: Procedure(s):  LAPAROSCOPIC CHOLECYSTECTOMY  ENDOSCOPIC RETROGRADE CHOLANGIOPANCREATOGRAPHY WITH SPHINCTROTOMY WITH STONE REMOVAL       Anesthesia Type:  General    Note:  Disposition: Outpatient   Postop Pain Control: Uneventful            Sign Out: Well controlled pain   PONV: No   Neuro/Psych: Uneventful            Sign Out: Acceptable/Baseline neuro status   Airway/Respiratory: Uneventful            Sign Out: Acceptable/Baseline resp. status   CV/Hemodynamics: Uneventful            Sign Out: Acceptable CV status; No obvious hypovolemia; No obvious fluid overload   Other NRE: NONE   DID A NON-ROUTINE EVENT OCCUR? No           Last vitals:  Vitals Value Taken Time   /69 05/20/22 1045   Temp 37  C (98.6  F) 05/20/22 0954   Pulse 48 05/20/22 1055   Resp 13 05/20/22 1055   SpO2 98 % 05/20/22 1055   Vitals shown include unvalidated device data.    Electronically Signed By: Tulio Rojas MD  May 20, 2022  10:57 AM

## 2022-05-20 NOTE — ANESTHESIA CARE TRANSFER NOTE
Patient: Lois Nunez    Procedure: Procedure(s):  LAPAROSCOPIC CHOLECYSTECTOMY  ENDOSCOPIC RETROGRADE CHOLANGIOPANCREATOGRAPHY WITH SPHINCTROTOMY WITH STONE REMOVAL       Diagnosis: Acute cholecystitis [K81.0]  Diagnosis Additional Information: No value filed.    Anesthesia Type:   General     Note:    Oropharynx: oropharynx clear of all foreign objects  Level of Consciousness: drowsy  Oxygen Supplementation: face mask  Level of Supplemental Oxygen (L/min / FiO2): 6  Independent Airway: airway patency satisfactory and stable  Dentition: dentition unchanged  Vital Signs Stable: post-procedure vital signs reviewed and stable  Report to RN Given: handoff report given  Patient transferred to: PACU    Handoff Report: Identifed the Patient, Identified the Reponsible Provider, Reviewed the pertinent medical history, Discussed the surgical course, Reviewed Intra-OP anesthesia mangement and issues during anesthesia, Set expectations for post-procedure period and Allowed opportunity for questions and acknowledgement of understanding      Vitals:  Vitals Value Taken Time   /57    Temp 37  C (98.6  F) 05/20/22 0954   Pulse 74 05/20/22 0954   Resp 17 05/20/22 0954   SpO2 100 % 05/20/22 0954   Vitals shown include unvalidated device data.    Electronically Signed By: PA Fuentes CRNA  May 20, 2022  9:55 AM

## 2022-05-20 NOTE — ANESTHESIA PREPROCEDURE EVALUATION
Anesthesia Pre-Procedure Evaluation    Patient: Lois Nunez   MRN: [id number] : 1985        Procedure : Procedure(s):  LAPAROSCOPIC CHOLECYSTECTOMY WITH CHOLANGIOGRAM          Past Medical History:   Diagnosis Date     Uncomplicated asthma       Past Surgical History:   Procedure Laterality Date      SECTION N/A 3/14/2018    Procedure:  SECTION;;  Surgeon: Paola Avila MD;  [facility]     LAPAROSCOPIC ABLATION ENDOMETRIOSIS        Allergies   Allergen Reactions     Acetaminophen-Codeine      Other reaction(s): GI Upset     Amoxicillin Hives     As a child     Codeine Sulfate GI Disturbance     Erythromycin GI Disturbance     Sulfadiazine      Other reaction(s): GI Upset     Penicillins Rash     Other reaction(s): rash      Social History     Tobacco Use     Smoking status: Never Smoker     Smokeless tobacco: Never Used   Substance Use Topics     Alcohol use: Not Currently     Comment: ';      Wt Readings from Last 1 Encounters:   22 67.6 kg (149 lb)        Anesthesia Evaluation   Pt has had prior anesthetic.         ROS/MED HX  ENT/Pulmonary:     (+) asthma     Neurologic:  - neg neurologic ROS     Cardiovascular:  - neg cardiovascular ROS  (-) murmur and wheezes   METS/Exercise Tolerance: >4 METS    Hematologic:  - neg hematologic  ROS     Musculoskeletal:  - neg musculoskeletal ROS     GI/Hepatic:     (+) cholecystitis/cholelithiasis,     Renal/Genitourinary:  - neg Renal ROS     Endo:  - neg endo ROS     Psychiatric/Substance Use:  - neg psychiatric ROS     Infectious Disease:  - neg infectious disease ROS     Malignancy:  - neg malignancy ROS     Other:  - neg other ROS          Physical Exam    Airway  airway exam normal      Mallampati: I   TM distance: > 3 FB   Neck ROM: full   Mouth opening: > 3 cm    Respiratory Devices and Support         Dental  no notable dental history         Cardiovascular          Rhythm and rate: regular and normal (-) no  murmur    Pulmonary           breath sounds clear to auscultation   (-) no wheezes        OUTSIDE LABS:  CBC:   Lab Results   Component Value Date    WBC 7.5 05/20/2022    WBC 9.6 10/14/2020    HGB 13.5 05/20/2022    HGB 12.0 10/14/2020    HCT 40.0 05/20/2022    HCT 35.8 10/14/2020     05/20/2022     10/14/2020     BMP:   Lab Results   Component Value Date     05/20/2022    POTASSIUM 4.0 05/20/2022    CHLORIDE 104 05/20/2022    CO2 25 05/20/2022    BUN 12 05/20/2022    CR 0.77 05/20/2022    GLC 97 05/20/2022     COAGS: No results found for: PTT, INR, FIBR  POC:   Lab Results   Component Value Date    HCGS Negative 05/20/2022     HEPATIC:   Lab Results   Component Value Date    ALBUMIN 4.4 05/20/2022    PROTTOTAL 7.4 05/20/2022     (H) 05/20/2022     (H) 05/20/2022    ALKPHOS 120 05/20/2022    BILITOTAL 1.1 (H) 05/20/2022     OTHER:   Lab Results   Component Value Date    BILLY 9.4 05/20/2022    LIPASE 44 05/20/2022       Anesthesia Plan    ASA Status:  1, emergent    NPO Status:  NPO Appropriate    Anesthesia Type: General.     - Airway: ETT   Induction: Intravenous, Propofol.   Maintenance: TIVA.        Consents    Anesthesia Plan(s) and associated risks, benefits, and realistic alternatives discussed. Questions answered and patient/representative(s) expressed understanding.     - Discussed: Risks, Benefits and Alternatives for BOTH SEDATION and the PROCEDURE were discussed     - Discussed with:  Patient      - Patient is DNR/DNI Status: No         Postoperative Care    Pain management: IV analgesics, Oral pain medications.   PONV prophylaxis: Ondansetron (or other 5HT-3), Dexamethasone or Solumedrol     Comments:                Tulio Rojas MD

## 2022-05-20 NOTE — ED PROVIDER NOTES
EMERGENCY DEPARTMENT ENCOUNTER      NAME: Lois Nunez  AGE: 36 year old female  YOB: 1985  MRN: [id number]  EVALUATION DATE & TIME: 2022 12:44 AM    PCP: Chema Pickett    ED PROVIDER: Gilbert Nicolas M.D.      Chief Complaint   Patient presents with     Abdominal Pain       FINAL IMPRESSION:  1. Acute cholecystitis        ED COURSE & MEDICAL DECISION MAKIN year old female presents to the Emergency Department for evaluation of abdominal pain.  History of cholelithiasis and planning for surgery a week from now.  Presents with right upper quadrant and epigastric abdominal pain.  She has some gallbladder wall thickening as well as cholelithiasis and common bile duct measuring 8 mm although no intraluminal stones seen.  She does have some abnormal liver function tests and mildly elevated bilirubin although is afebrile with normal white blood cell count and appears nontoxic on exam.  She was kept n.p.o. here.  She was started on ciprofloxacin and Flagyl.  Reviewed the case with general surgery briefly.  Patient will be admitted for further consultations and management of acute cholecystitis and abnormal LFTs.    12:56 AM I met with the patient, obtained history, performed an initial exam, and discussed options and plan for diagnostics and treatment here in the ED.  3:03 AM I spoke to Dr. Howell, general surgery.  3:07 AM I rechecked and updated patient on results.  3:14 AM I discussed case with Dr. Callahan, hospitalist, who accepts the patient for admission.    At the conclusion of the encounter I discussed the results of all of the tests and the disposition. The questions were answered. The patient or family acknowledged understanding and was agreeable with the care plan.       MEDICATIONS GIVEN IN THE EMERGENCY:  Medications   ciprofloxacin (CIPRO) infusion 400 mg (has no administration in time range)   metroNIDAZOLE (FLAGYL) infusion 500 mg (has no administration in time range)    morphine (PF) injection 4 mg (4 mg Intravenous Given 22 0110)   ondansetron (ZOFRAN) injection 4 mg (4 mg Intravenous Given 22 0111)       NEW PRESCRIPTIONS STARTED AT TODAY'S ER VISIT  New Prescriptions    No medications on file          =================================================================    HPI    Patient information was obtained from: Patient    Use of : N/A         Lois Nunez is a 36 year old female with a pertinent history of endometritis and factor V deficiency who presents to this ED via walk-in for evaluation of abdominal pain.    Patient reports epigastric abdominal pain that started six days ago, which radiates to her back. Patient has been alternating between Aleve and Tylenol every three hours with some relief. However she notes that within the last couple hours, pain has worsened such that she is unable to bear the pain up until the third hour to take pain medications. Patient reports her last dose of Tylenol was at 2300. At present, pain is 2/10 but reports that when she does not take medications, pain is 8-9/10. Patient notes associating nausea but denies any vomiting. She notes previous  and three laparoscopies for endometriosis. No other complaints at this time.    Per chart review, patient was seen at [facility] in [place] on 22 for chronic cholecystitis. The patient is presenting with what appears to be chronic cholecystitis.  This has been ongoing for over 8 years.  Per the patient, she had an outside HIDA scan that revealed that the gallbladder was not visible during the test. This test likely represented acute on chronic cholecystitis.  Ultrasound does show that she has multiple gallstones. Discussed possible open robotic assisted laparoscopic cholecystectomy, to which patient consented to undergo procedure.     REVIEW OF SYSTEMS   All systems reviewed and negative except as noted in HPI.    PAST  "MEDICAL HISTORY:  Past Medical History:   Diagnosis Date     Uncomplicated asthma        PAST SURGICAL HISTORY:  Past Surgical History:   Procedure Laterality Date      SECTION N/A 3/14/2018    Procedure:  SECTION;;  Surgeon: Paola Avila MD;  Location: [facility]     LAPAROSCOPIC ABLATION ENDOMETRIOSIS             CURRENT MEDICATIONS:    Current Facility-Administered Medications   Medication     ciprofloxacin (CIPRO) infusion 400 mg     metroNIDAZOLE (FLAGYL) infusion 500 mg     Current Outpatient Medications   Medication     Bioflavonoid Products (VITAMIN C) CHEW     docusate sodium (COLACE) 100 MG capsule     HYDROcodone-acetaminophen (NORCO) 5-325 MG tablet     L-Glutamine 500 MG CAPS     meloxicam (MOBIC) 15 MG tablet         ALLERGIES:  Allergies   Allergen Reactions     Acetaminophen-Codeine      Other reaction(s): GI Upset     Amoxicillin Hives     As a child     Codeine Sulfate GI Disturbance     Erythromycin GI Disturbance     Sulfadiazine      Other reaction(s): GI Upset     Penicillins Rash     Other reaction(s): rash       FAMILY HISTORY:  Family History   Problem Relation Age of Onset     Diabetes Maternal Grandmother      Heart Disease Maternal Grandmother      Cancer - colorectal Paternal Grandmother      Heart Disease Paternal Grandfather        SOCIAL HISTORY:   Social History     Socioeconomic History     Marital status:    Tobacco Use     Smoking status: Never Smoker     Smokeless tobacco: Never Used   Substance and Sexual Activity     Alcohol use: Not Currently     Comment: ';     Drug use: No     Sexual activity: Yes     Partners: Male   Other Topics Concern     Parent/sibling w/ CABG, MI or angioplasty before 65F 55M? No       VITALS:  /61   Pulse 66   Temp 98  F (36.7  C) (Oral)   Resp 18   Ht 1.702 m (5' 7\")   Wt 67.6 kg (149 lb)   SpO2 99%   BMI 23.34 kg/m      PHYSICAL EXAM    Constitutional: Well developed, Well nourished, NAD.  HENT: Normocephalic, " Atraumatic. Neck Supple.  Eyes: EOMI, Conjunctiva normal.  Respiratory: Breathing comfortably on room air. Speaks full sentences easily. Lungs clear to ascultation.  Cardiovascular: Normal heart rate, Regular rhythm. No peripheral edema.  Abdomen: Soft, moderate epigastric and right upper quadrant tenderness.  Musculoskeletal: Good range of motion in all major joints. No major deformities noted.  Integument: Warm, Dry.  Neurologic: Alert & awake, Normal motor function, Normal sensory function, No focal deficits noted.   Psychiatric: Cooperative. Affect appropriate.     LAB:  All pertinent labs reviewed and interpreted.  Labs Ordered and Resulted from Time of ED Arrival to Time of ED Departure   COMPREHENSIVE METABOLIC PANEL - Abnormal       Result Value    Sodium 140      Potassium 4.0      Chloride 104      Carbon Dioxide (CO2) 25      Anion Gap 11      Urea Nitrogen 12      Creatinine 0.77      Calcium 9.4      Glucose 97      Alkaline Phosphatase 120       (*)      (*)     Protein Total 7.4      Albumin 4.4      Bilirubin Total 1.1 (*)     GFR Estimate >90     LIPASE - Normal    Lipase 44     HCG QUALITATIVE PREGNANCY - Normal    hCG Serum Qualitative Negative     CBC WITH PLATELETS AND DIFFERENTIAL    WBC Count 7.5      RBC Count 4.45      Hemoglobin 13.5      Hematocrit 40.0      MCV 90      MCH 30.3      MCHC 33.8      RDW 12.8      Platelet Count 183      % Neutrophils 65      % Lymphocytes 23      % Monocytes 11      % Eosinophils 1      % Basophils 0      % Immature Granulocytes 0      NRBCs per 100 WBC 0      Absolute Neutrophils 4.8      Absolute Lymphocytes 1.7      Absolute Monocytes 0.8      Absolute Eosinophils 0.1      Absolute Basophils 0.0      Absolute Immature Granulocytes 0.0      Absolute NRBCs 0.0     COVID-19 VIRUS (CORONAVIRUS) BY PCR       RADIOLOGY:  Reviewed all pertinent imaging. Please see official radiology report.  Abdomen US, limited (RUQ only)   Final Result    IMPRESSION:   1.  Cholelithiasis. Mild gallbladder wall thickening, 4 mm. Sonographic Esipnoza's sign positive. Acute cholecystitis not excluded.   2.  No biliary dilatation.                    I, Barb Jama, am serving as a scribe to document services personally performed by Dr. Gilbert Nicolas, based on my observation and the provider's statements to me. I, Gilbert Nicolas MD attest that Barb Jama is acting in a scribe capacity, has observed my performance of the services and has documented them in accordance with my direction.    Gilbert Nicolas M.D.  Emergency Medicine  [facility] EMERGENCY DEPARTMENT  [address] [place] [id number] [phone]  Dept: [phone]     Gilbert Nicolas MD  05/20/22 0334

## 2022-05-20 NOTE — ED TRIAGE NOTES
"Pt arrives via triage w/ mom endorsing gallbladder issues of which she is supposed to have surgery 5/27 and has been taking tylenol & ibuprofen q 3 hrs and wears off prior to next dose and not able to tolerate the symptoms and \"not going to make it to the surgery\". Pt having pain in central abdomen and into back and now starting to radiate now in between shoulder blades as of recently, & having gas potentially due to codeine medications pt endorses.    no pain currently. Took tylenol last at 2300 5/19  "

## 2022-05-20 NOTE — CONSULTS
History:  36 year old year old female who I have been consulted by Dr. Callahan for evaluation of cholecystitis.  She has had intermittent upper abdominal pain for years.  Over the last couple months it has gotten significantly worse.  She was actually seen by my partner, Dr. Keith, last week.  She was scheduled for cholecystectomy next week.  Over the last couple of days she has had significant worsening in her symptoms.  The pain is located in the epigastrium and radiates to her back.  She presented to the ED where she was found to have a dilated CBD of 8 mm.  Previously it had been 2 mm.  She also has elevated AST and ALT.  These findings are concerning for choledocholithiasis.  Therefore, general surgery and gastroenterology have been consulted.    Allergies:  Acetaminophen-codeine, Amoxicillin, Codeine sulfate, Erythromycin, Sulfadiazine, and Penicillins    Past medical history:  Asthma    Past surgical history:    Laparoscopic ablation of endometriosis x3    Current medications:     Bioflavonoid Products (VITAMIN C) CHEW, , Disp: , Rfl:      docusate sodium (COLACE) 100 MG capsule, Take 1 capsule (100 mg) by mouth 2 times daily, Disp: 30 capsule, Rfl: 0     HYDROcodone-acetaminophen (NORCO) 5-325 MG tablet, Take 1 tablet by mouth every 6 hours as needed for pain, Disp: 18 tablet, Rfl: 0     L-Glutamine 500 MG CAPS, 1 capsule, Disp: , Rfl:      meloxicam (MOBIC) 15 MG tablet, TAKE 1 TABLET BY MOUTH EVERY DAY FOR 7 DAYS, Disp: , Rfl:     Family history:  No known history of anesthesia problems    Social history:  Reports that she has never smoked. She has never used smokeless tobacco. She reports previous alcohol use. She reports that she does not use drugs.      Review of Systems:  General: No complaints or constitutional symptoms  Skin: No complaints or symptoms   Hematologic/Lymphatic: No symptoms or complaints  Psychiatric: No symptoms or complaints  Endocrine: No excessive fatigue, no hypermetabolic  "symptoms reported  Respiratory: No cough, shortness of breath, or wheezing  Cardiovascular: No chest pain or dyspnea on exertion  Gastrointestinal: As per HPI  Musculoskeletal: No recent injuries reported  Neurological: No focal neurologic defects reported.      Exam:  /78 (BP Location: Left arm)   Pulse 55   Temp 98.1  F (36.7  C) (Oral)   Resp 18   Ht 1.702 m (5' 7\")   Wt 67.6 kg (149 lb)   LMP 04/23/2022 (Exact Date)   SpO2 100%   Breastfeeding Yes   BMI 23.34 kg/m    Body mass index is 23.34 kg/m .  General: Alert, cooperative, appears stated age   Skin: Skin color, texture, turgor normal, no rashes or lesions   Lymphatic: No obvious adenopathy, no swelling   Eyes: No scleral icterus, pupils equal  HENT: No traumatic injury to the head or face, no gross abnormalities  Lungs: Normal respiratory effort, breath sounds equal bilaterally  Heart: Regular rate and rhythm  Abdomen: Soft, nondistended, mildly tender to palpation in epigastrium without guarding or rebound.  Musculoskeletal: No obvious swelling  Neurologic: Grossly intact    Labs:  Lab Results   Component Value Date    WBC 7.5 05/20/2022    WBC 10.9 03/14/2018    HGB 13.5 05/20/2022    HGB 10.5 03/15/2018    HCT 40.0 05/20/2022    HCT 39.2 03/14/2018    MCV 90 05/20/2022    MCV 94 03/14/2018     05/20/2022     03/14/2018     Recent Labs   Lab 05/20/22  0104      CO2 25   BUN 12     Lab Results   Component Value Date     (H) 05/20/2022     (H) 05/20/2022    ALKPHOS 120 05/20/2022       Imaging:   Pertinent images personally reviewed by myself and discussed with the patient.    Radiology reports:  EXAM: US ABDOMEN LIMITED  LOCATION: [facility]  DATE/TIME: 5/20/2022 2:06 AM     INDICATION: abdominal pain, epigastric, hx of gallstones  COMPARISON: None.  TECHNIQUE: Limited abdominal ultrasound.     FINDINGS:  GALLBLADDER: Cholelithiasis. Gallbladder wall thickening, 4 mm. Sonographic " Espinoza's sign positive.  BILE DUCTS: No biliary dilatation. The common duct measures 8 mm.  LIVER: Grossly within normal limits where seen.  RIGHT KIDNEY: No hydronephrosis.   PANCREAS: Partial obscuration by bowel gas.  No visible ascites.                                                                   IMPRESSION:  1.  Cholelithiasis. Mild gallbladder wall thickening, 4 mm. Sonographic Espinoza's sign positive. Acute cholecystitis not excluded.  2.  No biliary dilatation.       My interpretation:  Gallstones.  CBD dilated at 8mm compared to 2mm on previous US.      Assessment:   Lois Nunez is a 36 year old female with acute on chronic abdominal pain and underlying history of cholelithiasis.  Her work-up suggest that she has developed choledocholithiasis.    Plan:  The pathophysiology of biliary disease was discussed with the patient and her mother, Amita.  Her work-up suggest that she has underlying choledocholithiasis.  Management options were discussed.  Her case was reviewed with Dr. Persaud.  We recommended proceeding with laparoscopic cholecystectomy followed by ERCP.  The risks and benefits of procedure were discussed.  We are currently awaiting staff in OR availability.  Postoperative recovery and restrictions reviewed.    Nirmala Mccormack DO  General Surgeon  [facility]  Surgery [address]  [address]  [place] [address]  Office: [phone]  Employed by - [facility]

## 2022-05-20 NOTE — ANESTHESIA PROCEDURE NOTES
Airway       Patient location during procedure: OR       Procedure Start/Stop Times: 5/20/2022 8:07 AM  Staff -        Anesthesiologist:  Tulio Rojas MD       CRNA: Su Fuentes APRN CRNA       Performed By: CRNAIndications and Patient Condition       Indications for airway management: hank-procedural         Mask difficulty assessment: 1 - vent by mask    Final Airway Details       Final airway type: endotracheal airway       Successful airway: ETT - single  Endotracheal Airway Details        ETT size (mm): 7.0       Cuffed: yes       Successful intubation technique: direct laryngoscopy       DL Blade Type: Mayers 2       Grade View of Cords: 1       Adjucts: stylet       Position: Right       Measured from: lips       Secured at (cm): 21       Bite block used: None    Post intubation assessment        Placement verified by: capnometry, equal breath sounds and chest rise        Number of attempts at approach: 1       Secured with: silk tape       Ease of procedure: easy       Dentition: Intact and Unchanged    Medication(s) Administered   Medication Administration Time: 5/20/2022 8:07 AM

## 2022-05-20 NOTE — H&P
[facility]    History and Physical - Hospitalist Service       Date of Admission:  5/20/2022    Assessment & Plan      Lois Nunez is a 36 year old female admitted on 5/20/2022. She came for evaluation of increasing epigastric abdominal pain with radiation to the back.    1.  Cholelithiasis, probable acute on chronic cholecystitis   Abnormal LFTs, total bilirubin 1.1 might suggest some obstruction  Abdominal ultrasound showed common bile duct measures 8 mm  Nothing by mouth  IV hydration  Received 1 dose of IV Cipro and Flagyl  Trend LFTs  GI and general surgery consults    2.  Bradycardia, asymptomatic  Resting heart rate in the 60s, was noted with heart rate in the 30s on pulse ox  Check preop ECG     Diet: NPO for Medical/Clinical Reasons Except for: Meds    DVT Prophylaxis: Pneumatic Compression Devices  Duffy Catheter: Not present  Central Lines: None  Cardiac Monitoring: None  Code Status: Full Code      Clinically Significant Risk Factors Present on Admission                      Disposition Plan   Expected Discharge:  1 day  Anticipated discharge location:  Awaiting care coordination huddle  Delays:    Likely surgery       The patient's care was discussed with the Patient and Patient's Family.    Jayden Callahan MD  Hospitalist Service  [facility]  Securely message with the Vocera Web Console (learn more here)  Text page via [employer] Paging/Directory         ______________________________________________________________________    Chief Complaint   Abdominal pain, nausea    History is obtained from the patient, electronic health record and emergency department physician    History of Present Illness   Lois Nunez is a 36 year old female who came to the emergency department for evaluation of abdominal pain.  Past medical history of asthma, endometriosis, factor V Leiden deficiency, gallstones.  Patient has had abdominal pain for 8 years and as per her mother  at the bedside a HIDA scan could not be completed recently.  She was evaluated by general surgeon Dr. Keith on 2022 and patient was scheduled for cholecystectomy on 2022.  However, since yesterday morning she experienced increased epigastric abdominal pain with radiation to the back.  She was taking Tylenol with codeine alternating with ibuprofen every 3 hours.  She became nauseated but no vomiting and thinks it was because of the codeine component.  She had some soft stools that she thought was related to stool softeners while taking codeine.  She denied fevers, chills, chest pain, shortness of breath or palpitations.  Upon ED arrival her heart rate was 100 and temperature 98  F.  Per RN, pulse ox showed a heart rate into the 30s while at rest.  Patient denies previous history of anesthesia reactions.  Her dad had bypass surgery at age 61 and there is also significant family history of coronary artery disease on her mother's side.    Review of Systems    The 10 point Review of Systems is negative other than noted in the HPI or here.     Past Medical History    I have reviewed this patient's medical history and updated it with pertinent information if needed.   Past Medical History:   Diagnosis Date     Uncomplicated asthma        Past Surgical History   I have reviewed this patient's surgical history and updated it with pertinent information if needed.  Past Surgical History:   Procedure Laterality Date      SECTION N/A 3/14/2018    Procedure:  SECTION;;  Surgeon: Paola Avila MD;  Location: [facility]     LAPAROSCOPIC ABLATION ENDOMETRIOSIS         Social History   I have reviewed this patient's social history and updated it with pertinent information if needed.  Social History     Tobacco Use     Smoking status: Never Smoker     Smokeless tobacco: Never Used   Substance Use Topics     Alcohol use: Not Currently     Comment: ';     Drug use: No       Family History   I have reviewed  this patient's family history and updated it with pertinent information if needed.  Family History   Problem Relation Age of Onset     Diabetes Maternal Grandmother      Heart Disease Maternal Grandmother      Cancer - colorectal Paternal Grandmother      Heart Disease Paternal Grandfather        Prior to Admission Medications   Prior to Admission Medications   Prescriptions Last Dose Informant Patient Reported? Taking?   Bioflavonoid Products (VITAMIN C) CHEW   Yes No   HYDROcodone-acetaminophen (NORCO) 5-325 MG tablet   No No   Sig: Take 1 tablet by mouth every 6 hours as needed for pain   L-Glutamine 500 MG CAPS   Yes No   Si capsule   docusate sodium (COLACE) 100 MG capsule   No No   Sig: Take 1 capsule (100 mg) by mouth 2 times daily   meloxicam (MOBIC) 15 MG tablet   Yes No   Sig: TAKE 1 TABLET BY MOUTH EVERY DAY FOR 7 DAYS      Facility-Administered Medications: None     Allergies   Allergies   Allergen Reactions     Acetaminophen-Codeine      Other reaction(s): GI Upset     Amoxicillin Hives     As a child     Codeine Sulfate GI Disturbance     Erythromycin GI Disturbance     Sulfadiazine      Other reaction(s): GI Upset     Penicillins Rash     Other reaction(s): rash       Physical Exam   Vital Signs: Temp: 98  F (36.7  C) Temp src: Oral BP: 104/61 Pulse: 66   Resp: 18 SpO2: 99 % O2 Device: None (Room air)    Weight: 149 lbs 0 oz    Constitutional: awake and alert  Respiratory: no increased work of breathing and good air exchange  Cardiovascular: regular rate and rhythm and normal S1 and S2  GI: normal bowel sounds, soft and tenderness noted in the right upper quadrant Espinoza's sign is present  Skin: no bruising or bleeding and no redness, warmth, or swelling  Musculoskeletal: no lower extremity pitting edema present  there is no redness, warmth, or swelling of the joints  Neurologic: Mental Status Exam:  Level of Alertness:   awake  Orientation:   person, place, time  Neuropsychiatric: General:  normal, calm and normal eye contact    Data   Data reviewed today: I reviewed all medications, new labs and imaging results over the last 24 hours. I personally reviewed no images or EKG's today.    Recent Labs   Lab 05/20/22  0104   WBC 7.5   HGB 13.5   MCV 90         POTASSIUM 4.0   CHLORIDE 104   CO2 25   BUN 12   CR 0.77   ANIONGAP 11   BILLY 9.4   GLC 97   ALBUMIN 4.4   PROTTOTAL 7.4   BILITOTAL 1.1*   ALKPHOS 120   *   *   LIPASE 44     Recent Results (from the past 24 hour(s))   Abdomen US, limited (RUQ only)    Narrative    EXAM: US ABDOMEN LIMITED  LOCATION: [facility]  DATE/TIME: 5/20/2022 2:06 AM    INDICATION: abdominal pain, epigastric, hx of gallstones  COMPARISON: None.  TECHNIQUE: Limited abdominal ultrasound.    FINDINGS:    GALLBLADDER: Cholelithiasis. Gallbladder wall thickening, 4 mm. Sonographic Espinoza's sign positive.    BILE DUCTS: No biliary dilatation. The common duct measures 8 mm.    LIVER: Grossly within normal limits where seen.    RIGHT KIDNEY: No hydronephrosis.    PANCREAS: Partial obscuration by bowel gas.    No visible ascites.      Impression    IMPRESSION:  1.  Cholelithiasis. Mild gallbladder wall thickening, 4 mm. Sonographic Espinoza's sign positive. Acute cholecystitis not excluded.  2.  No biliary dilatation.

## 2022-05-20 NOTE — PROGRESS NOTES
Patient was admitted this morning by my colleague for acute on chronic cholecystitis and choledocholithiasis. Patient underwent lap arnulfo and ERCP. The patient was in OR/PACU most of the time today. The patient has been discharged home by surgical team even before I could see her.

## 2022-05-23 ENCOUNTER — TELEPHONE (OUTPATIENT)
Dept: SURGERY | Facility: CLINIC | Age: 37
End: 2022-05-23
Payer: COMMERCIAL

## 2022-05-23 LAB
PATH REPORT.COMMENTS IMP SPEC: NORMAL
PATH REPORT.COMMENTS IMP SPEC: NORMAL
PATH REPORT.FINAL DX SPEC: NORMAL
PATH REPORT.GROSS SPEC: NORMAL
PATH REPORT.MICROSCOPIC SPEC OTHER STN: NORMAL
PATH REPORT.RELEVANT HX SPEC: NORMAL
PHOTO IMAGE: NORMAL

## 2022-05-23 PROCEDURE — 88304 TISSUE EXAM BY PATHOLOGIST: CPT | Mod: 26 | Performed by: PATHOLOGY

## 2022-05-24 ENCOUNTER — DOCUMENTATION ONLY (OUTPATIENT)
Dept: SURGERY | Facility: CLINIC | Age: 37
End: 2022-05-24
Payer: COMMERCIAL

## 2022-05-24 NOTE — PROGRESS NOTES
Surgery has been canceled on 5/27 per patient she had emergency surgery last Friday so we will not need to do surgery this week.   JNS notified   Surgeon Notified

## 2022-09-03 ENCOUNTER — HEALTH MAINTENANCE LETTER (OUTPATIENT)
Age: 37
End: 2022-09-03

## 2023-06-03 ENCOUNTER — HEALTH MAINTENANCE LETTER (OUTPATIENT)
Age: 38
End: 2023-06-03

## 2024-07-06 ENCOUNTER — HEALTH MAINTENANCE LETTER (OUTPATIENT)
Age: 39
End: 2024-07-06

## (undated) DEVICE — GOWN IMPERVIOUS BREATHABLE SMART LG 89015

## (undated) DEVICE — DECANTER VIAL 2006S

## (undated) DEVICE — DRSG STERI STRIP 1/2X4" R1547

## (undated) DEVICE — TUBING SUCTION MEDI-VAC 1/4"X20' N620A - HE

## (undated) DEVICE — SOL WATER IRRIG 1000ML BOTTLE 2F7114

## (undated) DEVICE — CLIP APPLIER ENDO 5MM M/L LIGAMAX EL5ML

## (undated) DEVICE — GLOVE ESTEEM POWDER FREE SMT 6.5  2D72PT65

## (undated) DEVICE — SU MONOCRYL 0 CTB-1 36" YB946

## (undated) DEVICE — SOL NACL 0.9% IRRIG 1000ML BOTTLE 07138-09

## (undated) DEVICE — SU MONOCRYL 4-0 PS-2 18" UND Y496G

## (undated) DEVICE — BANDAGE ADH LF 1X3 ABN3100A

## (undated) DEVICE — TUBING IRRIG TUR Y TYPE 96" LF 6543-01

## (undated) DEVICE — CATH TRAY FOLEY 16FR SILICONE 907416

## (undated) DEVICE — STOCKING SLEEVE COMPRESSION CALF LG

## (undated) DEVICE — SUCTION MANIFOLD NEPTUNE 2 SYS 1 PORT 702-025-000

## (undated) DEVICE — SU VICRYL 0 CTB-1 36" UND JB946

## (undated) DEVICE — CUSTOM PACK LAP CHOLE SBA5BLCHEA

## (undated) DEVICE — STRAP KNEE/BODY 31143004

## (undated) DEVICE — ENDO POUCH UNIV RETRIEVAL SYSTEM INZII 10MM CD001

## (undated) DEVICE — GLOVE PROTEXIS BLUE W/NEU-THERA 7.0  2D73EB70

## (undated) DEVICE — SU VICRYL 0 CT-1 36" J346H

## (undated) DEVICE — SUTURE VICRYL+ 4-0 UNDYED PS-2 VCP496H

## (undated) DEVICE — ENDO TROCAR FIRST ENTRY KII FIOS Z-THRD 05X100MM CTF03

## (undated) DEVICE — DRSG STERI STRIP 1/4X3" R1541

## (undated) DEVICE — WIRE GUIDE 0.035"X450CM DREAMWIRE M00556140

## (undated) DEVICE — CLIP LIGACLIP LG YELLOW LT400

## (undated) DEVICE — PREP DURAPREP 26ML APL 8630

## (undated) DEVICE — BALLOON EXTRACTION 3-LUMEN 15X190MM 2.8MM TL B-V233P-A

## (undated) DEVICE — TUBING LAP SUCT/IRRIG STRYKER 250070500

## (undated) DEVICE — SUCTION CANISTER MEDIVAC LINER 1500ML W/LID 65651-515

## (undated) DEVICE — BASIN SET MAJOR

## (undated) DEVICE — ENDO SHEARS RENEW LAP ENDOCUT SCISSOR TIP 16.5MM 3142

## (undated) DEVICE — PACK C-SECTION LF PL15OTA83B

## (undated) DEVICE — ESU GROUND PAD UNIVERSAL W/O CORD

## (undated) DEVICE — PREP CHLORAPREP 26ML TINTED ORANGE  260815

## (undated) DEVICE — ENDO TROCAR FIRST ENTRY KII FIOS Z-THRD 11X100MM CTF33

## (undated) DEVICE — SOL WATER IRRIG 1000ML BOTTLE 07139-09

## (undated) DEVICE — SOL ADH LIQUID BENZOIN SWAB 0.6ML C1544

## (undated) DEVICE — GLOVE BIOGEL PI ULTRATOUCH G SZ 6.0 42160

## (undated) DEVICE — BLADE KNIFE SURG 11 371111

## (undated) DEVICE — ESU HANDLE PROBE HND CNTRL PENCIL GRIP STRL DISP EPH04

## (undated) DEVICE — SU VICRYL 3-0 CTX 36" UND J980H

## (undated) DEVICE — DRSG ABDOMINAL 07 1/2X8" 7197D

## (undated) DEVICE — TUBING SMOKE EVAC PNEUMOCLEAR HIGH FLOW 0620050250

## (undated) DEVICE — SU VICRYL+ 0 27 UR6 VLT VCP603H

## (undated) DEVICE — ENDO TROCAR SLEEVE KII Z-THREADED 05X100MM CTS02

## (undated) DEVICE — SU MONOCRYL 0 CT-1 36" Y346H

## (undated) DEVICE — PLATE GROUNDING ADULT W/CORD 9165L

## (undated) DEVICE — ENDO FUSION OMNI-TOME G31903

## (undated) DEVICE — ESU ELECTRODE MONOPOLAR 5MM DIA 34CML SHAFT L-HO EPS01

## (undated) DEVICE — BASIN EMESIS STERILE  SSK9005A

## (undated) RX ORDER — KETOROLAC TROMETHAMINE 30 MG/ML
INJECTION, SOLUTION INTRAMUSCULAR; INTRAVENOUS
Status: DISPENSED
Start: 2018-03-14

## (undated) RX ORDER — PROPOFOL 10 MG/ML
INJECTION, EMULSION INTRAVENOUS
Status: DISPENSED
Start: 2022-05-20

## (undated) RX ORDER — ONDANSETRON 2 MG/ML
INJECTION INTRAMUSCULAR; INTRAVENOUS
Status: DISPENSED
Start: 2022-05-20

## (undated) RX ORDER — BUPIVACAINE HYDROCHLORIDE AND EPINEPHRINE 2.5; 5 MG/ML; UG/ML
INJECTION, SOLUTION INFILTRATION; PERINEURAL
Status: DISPENSED
Start: 2022-05-20

## (undated) RX ORDER — FENTANYL CITRATE 50 UG/ML
INJECTION, SOLUTION INTRAMUSCULAR; INTRAVENOUS
Status: DISPENSED
Start: 2018-03-14

## (undated) RX ORDER — KETOROLAC TROMETHAMINE 30 MG/ML
INJECTION, SOLUTION INTRAMUSCULAR; INTRAVENOUS
Status: DISPENSED
Start: 2022-05-20

## (undated) RX ORDER — KETAMINE HYDROCHLORIDE 10 MG/ML
INJECTION INTRAMUSCULAR; INTRAVENOUS
Status: DISPENSED
Start: 2022-05-20

## (undated) RX ORDER — DEXAMETHASONE SODIUM PHOSPHATE 10 MG/ML
INJECTION INTRAMUSCULAR; INTRAVENOUS
Status: DISPENSED
Start: 2022-05-20

## (undated) RX ORDER — LIDOCAINE HYDROCHLORIDE 10 MG/ML
INJECTION, SOLUTION EPIDURAL; INFILTRATION; INTRACAUDAL; PERINEURAL
Status: DISPENSED
Start: 2022-05-20

## (undated) RX ORDER — FENTANYL CITRATE 50 UG/ML
INJECTION, SOLUTION INTRAMUSCULAR; INTRAVENOUS
Status: DISPENSED
Start: 2022-05-20

## (undated) RX ORDER — GLYCOPYRROLATE 0.2 MG/ML
INJECTION INTRAMUSCULAR; INTRAVENOUS
Status: DISPENSED
Start: 2022-05-20